# Patient Record
(demographics unavailable — no encounter records)

---

## 2017-03-17 NOTE — HPEPDOC
Physiatrist Note





ADMISSION H&P





DATE OF ADMISSION: 3/17/2017


DATE OF SERVICE: 3/17/2017





IDENTIFICATION STATEMENT: Patient is a 46-year-old woman with left dorsal basal 

ganglia CVA, right hemiparesis admitted for comprehensive integrated inpatient 

rehabilitation.





HISTORY OF PRESENT ILLNESS: Patient is a 46-year-old right hand dominant woman 

with multiple medical comorbidities who was admitted to [facility] on  with acute onset right hemiparesis. Patient describes episode of 

dizziness and right upper/lower extremity numbness while at work which 

resolved. Symptoms recurred shortly thereafter and were now accompanied by 

slurred speech. EMS was activated and patient was brought to [facility]. In transit to the hospital symptoms evolved to include right 

hemiparesis. She was found to have a left dorsal basal ganglia acute infarct. 

She was started on dual antiplatelet platelet therapy (aspirin and Plavix). 

Echocardiogram was negative for any thrombus. CT scan of the head and neck was 

without significant atherosclerotic disease. During her hospital stay, her 

neurologic symptoms waxed and waned for which she underwent a repeat CT which 

was negative for any hemorrhage. Due to decline in the patients baseline 

functional status, recommendation was for acute rehabilitation. On 2017 

the patient was deemed stable for discharge to [facility] 

inpatient rehabilitation unit.





PAST MEDICAL HISTORY:


Hypertension


Hyperlipidemia


Diabetes mellitus type 2


Tobacco use


Depression & Anxiety


Thyroid nodule, benign


Low back pain s/p AVIS w/o effect





PAST SURGICAL HISTORY:


Hysterectomy


 x 2


Tubal ligation





ALLERGIES: No known drug allergies





MEDICATIONS:


Aspirin 325 mg daily


Lipitor 80 mg by mouth every afternoon


Wellbutrin  mg every 24 hours


Buspar 10 mg by mouth every 8 hours


Plavix 75 mg by mouth daily


Glipizide 5 mg every 24 hours


Losartan 25 mg by mouth daily





FAMILY HISTORY: Father suffered from Parkinsons disease  in his 50s. 

Mother suffered from cardiac issues  in her 70s. Sister suffers from 

diabetes. All 3 daughters have depression, one daughter has bipolar disease and 

recovering from drug addiction. Another daughter has thyroid problems.





SOCIAL HISTORY: Prior to admission patient was working in the evenings for a 

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 She lives alone on a third floor 

apartment with elevator access. There are no steps to manage once within the 

apartment. He reports smoking daily for the last 12 years, started at 2 packs 

per day and just prior to her stroke had decreased to about  ppd. Denies 

alcohol use. No illicit drug use, past or present.





Review of Systems:  General:  no chills, +fatigue, no weight changes. Eyes: no 

recent change of vision, + reading glasses. Ears, Nose & Throat: no sore throat

, decreased hearing or nasal discharge. Chronic swallowing problems 2nd thyroid 

nodule. +tinnitus. Cardiovascular:  no chest pain, claudication, edema, 

syncopal episodes. Pul: no cough, SOB, orthopnea. GI: + constipation, no 

abdominal pain, GERD, N/V, incontinence. Genitourinary: no dysuria. 

Gynecological: no vaginal bleeding, +hysterectomy. Musculoskeletal:  +chronic 

episodic mild low back pain; no neck pain, mild episodic knee pain, no muscle 

pain. Neurological: +numbness & paresthesias right side body, including face, 

no tremors, HA. Hematological: No bleeding disorders. Skin: no rashes. 

Psychiatric: +depression & anxiety for which she was usually on Celexa and 

Buspar, no other behavioral issues.





VITAL SIGNS: 131/92; 73; 98% ra; 18





PHYSICAL EXAMINATION:


GENERAL: Well nourished, well developed, sitting up in bed, right lean, no 

acute distress.


HEENT: Normocephalic, atraumatic. Slight right facial droop. No jugular venous 

distention (JVD). PERRL, EOMI


CARDIOVASCULAR: S1, S2, regular rate. No LL edema or calf tenderness b/l


LUNGS: CTA b/l, no W/R/R


ABDOMEN: Soft, nontender, nondistended. +normoactive bowel sounds throughout.


NEUROLOGICAL: Alert and oriented x 3. Answers all questions appropriately, 

memory seems intact. Speech fluent. No significant dysarthria. No aphasia. F-t-

N intact left side. Manual muscle testin/5 left upper and lower limbs all 

major muscle groups; 0/5 right UL all major muscle groups; 2/5 right HF, 1/5 

right adductors; 3-/5 right KE, 3/5 right KF, 0/5 DF/SD, 2/5 right EHL. 

Sensation: decreased diffusely right side body, Intact ST left side. Deep 

tendon reflexes: 3+ right biceps, unable elicit right patellar, 2+ left biceps, 

1+ left patellar. 


SKIN: No skin breakdown.





LABORATORY DATA: 


3/15/17 (from [facility]): WBC 13.7; neutr 46%; HGB 13.9; Hct 39.6; BUN 10, Cr 0.5; 

Na 139; K 4.2; Glu 103





IMAGING:


CT A of the head: The intracranial segments of both the internal carotid, 

anterior, middle and posterior cerebral arteries, and anterior communicating 

artery are patent. The intracranial segments of the vertebral arteries and 

basilar arteries are patent. No aneurysm, hemodynamically significant flow 

stenosis or arteriovenous malformation is identified.





CTA of the neck: There is common origin of the brachiocephalic and left common 

carotid artery. The RN origins of the brachiocephalic, lateral common carotid 

and subclavian arteries are patent. Bilateral cervical internal carotid 

arteries are patent without evidence of hemodynamically significant stenosis. 

Origins of the bilateral vertebral arteries are patent. The right vertebral 

artery is dominant. Bilateral cervical vertebral arteries are patent without 

evidence of significant luminal stenosis or dissection. There is a 1.9 x 1.4 x 

2.7 cm heterogeneous enhancing nodule within the left thyroid lobe, which is 

enlarged.





MR brain 2017: Findings consistent with infarct involving left dorsal 

basal ganglia extending to the corona radiata.





2-D echocardiogram: Negative saline bubble study. Left ventricle size is 

normal. Estimated left ventricular ejection fraction 55-60%. Right ventricular 

systolic systolic function is normal. Aortic valve leaflets are mildly 

thickened. There is trace aortic regurgitation. Both mitral leaflets are mildly 

thickened.





Overnight oximetry: No evidence of desaturation.





CT of the head on 2017: Left basal ganglia stroke.





FUNCTIONAL STATUS, Premorbid: Independent with ADLs and ambulation. Working 

full time at night. Daughter can help her  upon discharge.


   


ASSESSMENT AND PLAN:


1. Left dorsal basal ganglia acute infarct with right hemiparesis and 

dysarthria tilting in decreased mobility and dysfunctional ADLs: Maintain 

patient on aspirin and Plavix for 30 days then switch to Asa alone. Continue 

Lipitor 80 mg daily. Maintain systolic blood pressure less than 140 mmHg. 

Patient will undergo thorough physical, occupational and speech therapy 

evaluations followed by daily therapy. Rehabilitation nursing for bladder, 

bowel and medication management.


2. Hypertension: Maintain Cozaar 25 mg daily with adjustments as indicated.


3. Anxiety/depression: Maintain BuSpar. Was usually on Celexa, but changed to 

Wellbutrin at [facility] to assist in smoking cessation.


4. Diabetes mellitus type 2 continue glipizide 5 mg daily.


5. Bowel: Constipation. Maintain patient on Colace senna scheduled along with 

milk of magnesia and MiraLAX on an as-needed basis.


6. Diet/nutrition: Will obtain a prealbumin with morning labs. Carbohydrate 

consistent diet.





POST ADMISSION PHYSICIAN EVALUATION: On evaluation of the patient today there'

ve been no significant medical issues or functional changes as compared to 

those noted in the preadmission screening document. This patient's inpatient 

rehabilitation remains necessary in light of the above conditions. The patient'

s medical condition requires specialized care with physicians specially trained 

in physical medicine rehabilitation. The patient is capable motivated to 

participate in a minimum of 3 hours of therapy daily, 5 days minimum per week, 

and requires intensive inpatient rehabilitation to improve their functional 

status so that they can be safely to discharge back to their home.





PROGNOSIS: Good





ESTIMATED LENGTH OF STAY: 14 days.


/


Vital Signs





 Vital Sign - Last 24 Hours








 3/17/17





 13:20


 


Temp 97.5


 


Pulse 73


 


Resp 18


 


B/P 134/92


 


Pulse Ox 98


 


O2 Delivery Room Air











Home Medications


Scheduled


Aspirin (Aspirin) 325 Mg Tab 325 MG PO DAILY (Reported) 


Atorvastatin Calcium (Atorvastatin Calcium) 80 Mg Tab 80 MG PO QHS (Reported) 


Bupropion Hcl (Bupropion HCl Xl) 150 Mg Tab 150 MG PO DAILY (Reported) 


Buspirone HCl (Buspirone HCl) 10 Mg Tab 10 MG PO TID (Reported) 


Clopidogrel Bisulfate (Plavix) 75 Mg Tab 75 MG PO DAILY (Reported) 


Glipizide (Glipizide ER) 5 Mg Tab 5 MG PO DAILY (Reported) 


Losartan Potassium (Losartan Potassium) 25 Mg Tab 25 MG PO DAILY (Reported) 


Nicotine (Nicotine 7MG Patch) 1 Patch Tdsy 1 PATCH TD DAILY (Reported) 


   APPLIED TO LEFT ARM 





Scheduled PRN


Acetaminophen (Acetaminophen) 325 Mg Tab 650 MG PO Q4H PRN PRN PAIN / FEVER (

Reported) 





Allergies


Coded Allergies:  


     Metformin (Unverified  Adverse Reaction, Unknown, "STATES CAN NOT TAKE" 

DOES NOT WORK WELL, 3/17/17)








DONNA CAMPBELL MD Mar 17, 2017 14:46

## 2017-03-20 NOTE — IPNPDOC
Physiatrist Progress Note





PROGRESS NOTE





DATE OF ADMISSION: 3/17/2017


DATE OF SERVICE: 3/20/2017





IDENTIFICATION STATEMENT: Patient is a 46-year-old woman with left dorsal basal 

ganglia CVA, right hemiparesis admitted for comprehensive integrated inpatient 

rehabilitation.





PAST MEDICAL HISTORY:


Hypertension


Hyperlipidemia


Diabetes mellitus type 2


Tobacco use


Depression & Anxiety


Thyroid nodule, benign


Low back pain s/p AVIS w/o effect





PAST SURGICAL HISTORY:


Hysterectomy


 x 2


Tubal ligation





ALLERGIES: No known drug allergies





MEDICATIONS:


Aspirin 325 mg daily


Lipitor 80 mg by mouth every afternoon


Wellbutrin  mg every 24 hours


Buspar 10 mg by mouth every 8 hours prn


Plavix 75 mg by mouth daily


Glipizide 5 mg every 24 hours


Losartan 25 mg by mouth daily





SUBJECTIVE: Patient reports urinary frequency, otherwise not complaints. Slept 

ok. No events over weekend. Denies any CP, SOB, N/V, HA or new weakness.





VITAL SIGNS:  97.7 degrees F; 124/73; 68; 97% ra; 18





PHYSICAL EXAMINATION:


GENERAL: Well nourished, well developed, standing up with walker, no acute 

distress.


HEENT: Normocephalic, atraumatic. Slight right facial droop. PERRL, EOMI


CARDIOVASCULAR: S1, S2, regular rate. No LL edema or calf tenderness b/l


LUNGS: CTA b/l, no W/R/R


ABDOMEN: Soft, nontender, nondistended. +normoactive bowel sounds throughout.


NEUROLOGICAL: Alert and oriented x 3. Answers all questions appropriately, 

memory intact. Speech fluent. Manual muscle testin/5 left upper and lower 

limbs all major muscle groups; 0/5 right UL all major muscle groups except 1/5 

shoulder shrug; 2/5 right HF, 1/5 right adductors; 3-/5 right KE, 3/5 right KF, 

0/5 DF/SC, 2/5 right EHL. Sensation: decreased diffusely right side body, 

Intact ST left side. Deep tendon reflexes: 3+ right biceps, unable elicit right 

patellar, 2+ left biceps, 1+ left patellar. 


SKIN: No skin breakdown.





LABORATORY DATA: 


3/20/17: reviewed see below





3/15/17 (from [facility]): WBC 13.7; neutr 46%; HGB 13.9; Hct 39.6; BUN 10, Cr 0.5; 

Na 139; K 4.2; Glu 103





IMAGING:


CT A of the head: The intracranial segments of both the internal carotid, 

anterior, middle and posterior cerebral arteries, and anterior communicating 

artery are patent. The intracranial segments of the vertebral arteries and 

basilar arteries are patent. No aneurysm, hemodynamically significant flow 

stenosis or arteriovenous malformation is identified.





CTA of the neck: There is common origin of the brachiocephalic and left common 

carotid artery. The RN origins of the brachiocephalic, lateral common carotid 

and subclavian arteries are patent. Bilateral cervical internal carotid 

arteries are patent without evidence of hemodynamically significant stenosis. 

Origins of the bilateral vertebral arteries are patent. The right vertebral 

artery is dominant. Bilateral cervical vertebral arteries are patent without 

evidence of significant luminal stenosis or dissection. There is a 1.9 x 1.4 x 

2.7 cm heterogeneous enhancing nodule within the left thyroid lobe, which is 

enlarged.





MR brain 2017: Findings consistent with infarct involving left dorsal 

basal ganglia extending to the corona radiata.





2-D echocardiogram: Negative saline bubble study. Left ventricle size is 

normal. Estimated left ventricular ejection fraction 55-60%. Right ventricular 

systolic systolic function is normal. Aortic valve leaflets are mildly 

thickened. There is trace aortic regurgitation. Both mitral leaflets are mildly 

thickened.





Overnight oximetry: No evidence of desaturation.





CT of the head on 2017: Left basal ganglia stroke.





FUNCTIONAL STATUS, Premorbid: Independent with ADLs and ambulation. Working 

full time at night. Daughter can help her /7 upon discharge.


   


ASSESSMENT AND PLAN:


1. Left dorsal basal ganglia acute infarct with right hemiparesis and 

dysarthria resulting in decreased mobility and dysfunctional ADLs: Maintain 

patient on aspirin and Plavix for 30 days then switch to Asa alone. Continue 

Lipitor 80 mg daily. Maintain systolic blood pressure less than 140 mmHg. 

Continue daily therapies. Rehabilitation nursing for bladder, bowel and 

medication management.





2. UTI, e.coli: started on Omnicef based on susceptibility panel.





3. Hypertension: Maintain Cozaar 25 mg daily with adjustments as indicated.





4. Anxiety/depression: Maintain BuSpar and Wellbutrin [Hx: Was on Celexa, but 

changed to Wellbutrin at [facility] to assist in smoking cessation].





5. Diabetes mellitus type 2 continue glipizide 5 mg daily.





6. Bowel: Constipation resolved. Maintain patient on Colace senna scheduled 

along with milk of magnesia and MiraLAX on an as-needed basis.





7. Diet/nutrition: Prealbumin wnl. Mechanical soft, nectar thick. Carbohydrate 

consistent diet.


/


Vital Signs





 Vital Sign - Last 24 Hours








 3/19/17 3/19/17 3/20/17 3/20/17





 14:00 20:00 06:00 08:32


 


Temp 98.1 98.3 97.7 


 


Pulse 73 87 68 


 


Resp 18 18 18 


 


B/P 133/74 132/72 124/73 124/73


 


Pulse Ox 97 97 97 


 


O2 Delivery Room Air Room Air Room Air 











Laboratory Data


CBC/BMP


 Laboratory Tests


3/20/17 06:20








Calcium Level 9.0, Red Blood Count 5.15, Mean Corpuscular Volume 84.6, Mean 

Corpuscular Hemoglobin 28.7, Mean Corpuscular Hemoglobin Concent 33.9, Red Cell 

Distribution Width 12.4, Neutrophils (%) (Auto) 52.1, Lymphocytes (%) (Auto) 

30.8, Monocytes (%) (Auto) 5.6 H, Eosinophils (%) (Auto) 8.6 H, Basophils (%) (

Auto) 0.5, Neutrophils # (Auto) 8.4 H, Lymphocytes # (Auto) 5.0 H, Monocytes # (

Auto) 0.9 H, Eosinophils # (Auto) 1.4 H, Basophils # (Auto) 0.1


Labs 24H


 Laboratory Tests 2


3/20/17 06:20: 


Anion Gap 8, White Blood Count 16.1H, Red Blood Count 5.15, Hemoglobin 14.8, 

Hematocrit 43.6, Mean Corpuscular Volume 84.6, Mean Corpuscular Hemoglobin 28.7

, Mean Corpuscular Hemoglobin Concent 33.9, Red Cell Distribution Width 12.4, 

Platelet Count 312, Neutrophils (%) (Auto) 52.1, Lymphocytes (%) (Auto) 30.8, 

Monocytes (%) (Auto) 5.6H, Eosinophils (%) (Auto) 8.6H, Basophils (%) (Auto) 0.5

, Neutrophils # (Auto) 8.4H, Lymphocytes # (Auto) 5.0H, Monocytes # (Auto) 0.9H

, Eosinophils # (Auto) 1.4H, Basophils # (Auto) 0.1, Blood Urea Nitrogen 17, 

Creatinine 0.73, Sodium Level 141, Potassium Level 4.5, Chloride Level 104, 

Carbon Dioxide Level 29, Calcium Level 9.0, Glomerular Filtration Rate > 60.0, 

Large Unclassified Cells # 0.4, Large Unclassified Cells % 2.4





Microbiology





 Microbiology


3/19/17 Blood Culture, Received


          Pending


3/19/17 Blood Culture, Received


          Pending


3/18/17 Urine Culture - Final, Complete


          Escherichia Coli





Allergies


Allergies:  


Coded Allergies:  


     Metformin (Unverified  Adverse Reaction, Unknown, "STATES CAN NOT TAKE" 

DOES NOT WORK WELL, 3/17/17)





Current Medications


Current Medications





 Current Medications


Acetaminophen (Tylenol Tab) 650 mg Q4HP  PRN PO MILD PAIN (PS 1-4) Last 

administered on 3/20/17at 04:09;  Start 3/17/17 at 13:45;  Stop 17 at 13:44


Aspirin (Aspirin) 325 mg DAILY PO  Last administered on 3/20/17at 08:30;  Start 

3/18/17 at 09:00;  Stop 17 at 08:59


Atorvastatin Calcium (Lipitor) 80 mg QHS PO  Last administered on 3/19/17at 21:

27;  Start 3/17/17 at 21:00;  Stop 17 at 20:59


Bupropion HCl (Wellbutrin Xl) 150 mg DAILY PO  Last administered on 3/20/17at 08

:31;  Start 3/18/17 at 09:00;  Stop 17 at 08:59


Buspirone HCl (Buspar) 10 mg Q8HP  PRN PO ANXIETY Last administered on 3/19/

17at 23:23;  Start 3/17/17 at 14:00;  Stop 17 at 13:59


Cefdinir (Omnicef) 300 mg BID PO ;  Start 3/20/17 at 09:00;  Stop 3/27/17 at 08:

59


Clopidogrel Bisulfate (PLAVix) 75 mg DAILY PO  Last administered on 3/20/17at 08

:32;  Start 3/18/17 at 09:00;  Stop 17 at 08:59


Docusate Sodium (Colace) 100 mg BID PO  Last administered on 3/19/17at 07:53;  

Start 3/17/17 at 21:00;  Stop 17 at 20:59


Glipizide (Glucotrol Xl) 5 mg DAILY@0730 PO  Last administered on 3/20/17at 08:

32;  Start 3/17/17 at 07:30;  Stop 17 at 07:29


Home Med (Med Rec Complete!)  ASDIRECTED XX ;  Start 3/17/17 at 14:15;  Stop 3/

17/17 at 14:15;  Status DC


Losartan Potassium (Cozaar) 25 mg DAILY PO  Last administered on 3/20/17at 08:32

;  Start 3/18/17 at 09:00;  Stop 17 at 08:59


Magnesium Hydroxide (Milk Of Magnesia) 30 ml DAILYPRN  PRN PO CONSTIPATION Last 

administered on 3/17/17at 23:37;  Start 3/17/17 at 13:45;  Stop 17 at 13:44


Pantoprazole Sodium (Protonix) 40 mg DAILY PO  Last administered on 3/20/17at 08

:31;  Start 3/17/17 at 09:00;  Stop 17 at 08:59


Polyethylene Glycol (Miralax) 1 pkt DAILY  PRN PO CONSTIPATION Last 

administered on 3/17/17at 23:38;  Start 3/17/17 at 13:45;  Stop 17 at 13:44


Senna (Senokot) 1 tab QHS PO  Last administered on 3/18/17at 20:13;  Start 3/17/

17 at 21:00;  Stop 17 at 20:59








DONNA CAMPBELL MD Mar 20, 2017 10:44

## 2017-03-21 NOTE — IPNPDOC
Physiatrist Progress Note





PROGRESS NOTE





DATE OF ADMISSION: 3/17/2017





IDENTIFICATION STATEMENT: Patient is a 46-year-old woman with left dorsal basal 

ganglia CVA, right hemiparesis admitted for comprehensive integrated inpatient 

rehabilitation.





PAST MEDICAL HISTORY:


Hypertension


Hyperlipidemia


Diabetes mellitus type 2


Tobacco use


Depression & Anxiety


Thyroid nodule, benign


Low back pain s/p AVIS w/o effect





PAST SURGICAL HISTORY:


Hysterectomy


 x 2


Tubal ligation





ALLERGIES: No known drug allergies





MEDICATIONS:


Aspirin 325 mg daily


Lipitor 80 mg by mouth every afternoon


Wellbutrin  mg every 24 hours


Buspar 10 mg by mouth every 8 hours prn


Plavix 75 mg by mouth daily


Glipizide 5 mg every 24 hours


Losartan 25 mg by mouth daily





SUBJECTIVE:  Patient w/o complaints. Feels good today. No issues overnight. 

Slept ok. Feels like betting better. Tired from therapy. Denies any CP, SOB, N/V

, C/D, HA





VITAL SIGNS:  97.5 degrees F; 124/74; 71; 98% ra; 18





PHYSICAL EXAMINATION:


GENERAL: Well nourished, well developed, sitting in chair, no acute distress.


HEENT: Normocephalic, atraumatic. Slight right facial droop. PERRL, EOMI


CARDIOVASCULAR: S1, S2, regular rate. No LL edema or calf tenderness b/l


LUNGS: CTA b/l, no W/R/R


ABDOMEN: Soft, nontender, nondistended. +normoactive bowel sounds throughout.


NEUROLOGICAL: Alert and oriented x 3. Answers all questions appropriately, 

memory intact. Speech fluent. MMT: 5/5 left upper and lower limbs all major 

muscle groups; 0/5 right UL all major muscle groups except 1/5 shoulder shrug; 2

/5 elbow flexion, 1/5 elbow extension,3-/5 , 2/5 right HF, 1/5 right 

adductors; 3-/5 right KE, 3/5 right KF, 0/5 DF/KY, 2/5 right EHL. 


SKIN: No skin breakdown.





LABORATORY DATA: 


3/21/17: reviewed, see below





3/15/17 (from [facility]): WBC 13.7; neutr 46%; HGB 13.9; Hct 39.6; BUN 10, Cr 0.5; 

Na 139; K 4.2; Glu 103





IMAGING:


CT A of the head: The intracranial segments of both the internal carotid, 

anterior, middle and posterior cerebral arteries, and anterior communicating 

artery are patent. The intracranial segments of the vertebral arteries and 

basilar arteries are patent. No aneurysm, hemodynamically significant flow 

stenosis or arteriovenous malformation is identified.





CTA of the neck: There is common origin of the brachiocephalic and left common 

carotid artery. The RN origins of the brachiocephalic, lateral common carotid 

and subclavian arteries are patent. Bilateral cervical internal carotid 

arteries are patent without evidence of hemodynamically significant stenosis. 

Origins of the bilateral vertebral arteries are patent. The right vertebral 

artery is dominant. Bilateral cervical vertebral arteries are patent without 

evidence of significant luminal stenosis or dissection. There is a 1.9 x 1.4 x 

2.7 cm heterogeneous enhancing nodule within the left thyroid lobe, which is 

enlarged.





MR brain 2017: Findings consistent with infarct involving left dorsal 

basal ganglia extending to the corona radiata.





2-D echocardiogram: Negative saline bubble study. Left ventricle size is 

normal. Estimated left ventricular ejection fraction 55-60%. Right ventricular 

systolic systolic function is normal. Aortic valve leaflets are mildly 

thickened. There is trace aortic regurgitation. Both mitral leaflets are mildly 

thickened.





Overnight oximetry: No evidence of desaturation.





CT of the head on 2017: Left basal ganglia stroke.





FUNCTIONAL STATUS, Premorbid: Independent with ADLs and ambulation. Working 

full time at night prior to CVA. Daughter can help her  upon discharge.


   


ASSESSMENT AND PLAN:


1. Left dorsal basal ganglia acute infarct with right hemiparesis and 

dysarthria resulting in decreased mobility and dysfunctional ADLs: Maintain 

patient on aspirin and Plavix for 30 days then switch to Asa alone. Continue 

Lipitor 80 mg daily. Maintain systolic blood pressure less than 140 mmHg. 

Continue daily therapies. Rehabilitation nursing for bladder, bowel and 

medication management.





2. UTI, e.coli: Seems to be responding to abx. Continue Omnicef (d#2/).





3. Hypertension: Adequately controlled.  Maintain Cozaar 25 mg daily with 

adjustments as indicated.





4. Anxiety/depression: Maintain BuSpar and Wellbutrin [Hx: Was on Celexa, but 

changed to Wellbutrin at [facility] to assist in smoking cessation].





5. Diabetes mellitus type 2: Continue glipizide 5 mg daily.





6. Bowel: Constipation resolved. Maintain patient on Colace senna scheduled 

along with milk of magnesia and MiraLAX on an as-needed basis.





7. Diet/nutrition: Prealbumin wnl. Mechanical soft, nectar thick. Carbohydrate 

consistent diet.


/


Vital Signs





 Vital Sign - Last 24 Hours








 3/20/17 3/20/17 3/21/17 3/21/17





 14:00 20:00 06:00 08:44


 


Temp 97.3 98.3 97.5 


 


Pulse 76 88 71 


 


Resp 20 19 18 


 


B/P 137/81 146/76 126/75 124/74


 


Pulse Ox 99 97 98 


 


O2 Delivery  Room Air Room Air 











Laboratory Data


CBC/BMP


 Laboratory Tests


3/21/17 06:43








Calcium Level 8.7, Red Blood Count 4.91, Mean Corpuscular Volume 85.8, Mean 

Corpuscular Hemoglobin 29.1, Mean Corpuscular Hemoglobin Concent 33.9, Red Cell 

Distribution Width 12.4, Neutrophils (%) (Auto) 46.0, Lymphocytes (%) (Auto) 

36.1, Monocytes (%) (Auto) 5.6 H, Eosinophils (%) (Auto) 9.5 H, Basophils (%) (

Auto) 0.5, Neutrophils # (Auto) 5.9, Lymphocytes # (Auto) 4.9 H, Monocytes # (

Auto) 0.7, Eosinophils # (Auto) 1.2 H, Basophils # (Auto) 0.1


Labs 24H


 Laboratory Tests 2


3/21/17 06:43: 


Anion Gap 6L, White Blood Count 12.8H, Red Blood Count 4.91, Hemoglobin 14.3, 

Hematocrit 42.1, Mean Corpuscular Volume 85.8, Mean Corpuscular Hemoglobin 29.1

, Mean Corpuscular Hemoglobin Concent 33.9, Red Cell Distribution Width 12.4, 

Platelet Count 303, Neutrophils (%) (Auto) 46.0, Lymphocytes (%) (Auto) 36.1, 

Monocytes (%) (Auto) 5.6H, Eosinophils (%) (Auto) 9.5H, Basophils (%) (Auto) 0.5

, Neutrophils # (Auto) 5.9, Lymphocytes # (Auto) 4.9H, Monocytes # (Auto) 0.7, 

Eosinophils # (Auto) 1.2H, Basophils # (Auto) 0.1, Blood Urea Nitrogen 16, 

Creatinine 0.68, Sodium Level 141, Potassium Level 4.0, Chloride Level 107, 

Carbon Dioxide Level 28, Calcium Level 8.7, Glomerular Filtration Rate > 60.0, 

Large Unclassified Cells # 0.3, Large Unclassified Cells % 2.2





Microbiology





 Microbiology


3/19/17 Blood Culture - Preliminary, Resulted


          No growth after 24 hours . All specim...


3/19/17 Blood Culture - Preliminary, Resulted


          No growth after 24 hours . All specim...


3/18/17 Urine Culture - Final, Complete


          Escherichia Coli





Allergies


Allergies:  


Coded Allergies:  


     Metformin (Unverified  Adverse Reaction, Unknown, "STATES CAN NOT TAKE" 

DOES NOT WORK WELL, 3/17/17)





Current Medications


Current Medications





 Current Medications


Acetaminophen (Tylenol Tab) 650 mg Q4HP  PRN PO MILD PAIN (PS 1-4) Last 

administered on 3/20/17at 04:09;  Start 3/17/17 at 13:45;  Stop 17 at 13:44


Aspirin (Aspirin) 325 mg DAILY PO  Last administered on 3/21/17at 08:45;  Start 

3/18/17 at 09:00;  Stop 17 at 08:59


Atorvastatin Calcium (Lipitor) 80 mg QHS PO  Last administered on 3/20/17at 21:

28;  Start 3/17/17 at 21:00;  Stop 17 at 20:59


Bupropion HCl (Wellbutrin Xl) 150 mg DAILY PO  Last administered on 3/21/17at 08

:45;  Start 3/18/17 at 09:00;  Stop 17 at 08:59


Buspirone HCl (Buspar) 10 mg Q8HP  PRN PO ANXIETY Last administered on 3/20/

17at 21:27;  Start 3/17/17 at 14:00;  Stop 17 at 13:59


Cefdinir (Omnicef) 300 mg BID PO  Last administered on 3/21/17at 08:45;  Start 3

/20/17 at 09:00;  Stop 3/27/17 at 08:59


Clopidogrel Bisulfate (PLAVix) 75 mg DAILY PO  Last administered on 3/21/17at 08

:41;  Start 3/18/17 at 09:00;  Stop 17 at 08:59


Docusate Sodium (Colace) 100 mg BID PO  Last administered on 3/21/17at 08:46;  

Start 3/17/17 at 21:00;  Stop 17 at 20:59


Glipizide (Glucotrol Xl) 5 mg DAILY@0730 PO  Last administered on 3/21/17at 08:

44;  Start 3/17/17 at 07:30;  Stop 17 at 07:29


Home Med (Med Rec Complete!)  ASDIRECTED XX ;  Start 3/17/17 at 14:15;  Stop 3/

17/17 at 14:15;  Status DC


Losartan Potassium (Cozaar) 25 mg DAILY PO  Last administered on 3/21/17at 08:44

;  Start 3/18/17 at 09:00;  Stop 17 at 08:59


Magnesium Hydroxide (Milk Of Magnesia) 30 ml DAILYPRN  PRN PO CONSTIPATION Last 

administered on 3/17/17at 23:37;  Start 3/17/17 at 13:45;  Stop 17 at 13:44


Pantoprazole Sodium (Protonix) 40 mg DAILY PO  Last administered on 3/21/17at 08

:45;  Start 3/17/17 at 09:00;  Stop 17 at 08:59


Polyethylene Glycol (Miralax) 1 pkt DAILY  PRN PO CONSTIPATION Last 

administered on 3/17/17at 23:38;  Start 3/17/17 at 13:45;  Stop 17 at 13:44


Senna (Senokot) 1 tab QHS PO  Last administered on 3/18/17at 20:13;  Start 3/17/

17 at 21:00;  Stop 17 at 20:59








DONNA CAMPBELL MD Mar 21, 2017 11:41

## 2017-03-22 NOTE — IPNPDOC
Physiatrist Progress Note





PROGRESS NOTE





DATE OF ADMISSION: 3/17/2017





IDENTIFICATION STATEMENT: Patient is a 46-year-old woman with left dorsal basal 

ganglia CVA, right hemiparesis admitted for comprehensive integrated inpatient 

rehabilitation.





PAST MEDICAL HISTORY:


Hypertension


Hyperlipidemia


Diabetes mellitus type 2


Tobacco use


Depression & Anxiety


Thyroid nodule, benign


Low back pain s/p AVIS w/o effect





PAST SURGICAL HISTORY:


Hysterectomy


 x 2


Tubal ligation





ALLERGIES: No known drug allergies





MEDICATIONS:


Aspirin 325 mg daily


Lipitor 80 mg by mouth every afternoon


Wellbutrin  mg every 24 hours


Busbar 10 mg by mouth every 8 hours prn


Plavix 75 mg by mouth daily


Glipizide 5 mg every 24 hours


Losartan 25 mg by mouth daily





SUBJECTIVE:  Patient states she frustrated by things she still cant do, states 

last night had itch on her left arm and couldnt scratch it. No issues 

overnight. Slept ok. Denies any CP, SOB, N/V, C/D, HA





VITAL SIGNS:  97.3 degrees F; 148/80; 68; 98% ra; 19





PHYSICAL EXAMINATION:


GENERAL: Well nourished, well developed, sitting in chair, no acute distress.


HEENT: Normocephalic, atraumatic. Right facial droop (decreased). PERRL, EOMI


CARDIOVASCULAR: S1, S2, regular rate. No LL edema or calf tenderness b/l


LUNGS: CTA b/l, no W/R/R


ABDOMEN: Soft, nontender, nondistended. +normoactive bowel sounds throughout.


NEUROLOGICAL: Alert and oriented x 3. Answers all questions appropriately, 

memory intact. Speech fluent. MMT: 5/5 left upper and lower limbs all major 

muscle groups; 0/5 right UL all major muscle groups except 1/5 shoulder shrug; 2

/5 elbow flexion, 1/5 elbow extension,3-/5 , 3/5 right HF, 3-/5 right KE, 3/

5 right KF, 0/5 DF/CO, 2/5 right EHL. 


SKIN: No skin breakdown.





LABORATORY DATA: 


3/22/17: reviewed, see below





3/15/17 (from [facility]): WBC 13.7; neutr 46%; HGB 13.9; Hct 39.6; BUN 10, Cr 0.5; 

Na 139; K 4.2; Glu 103





IMAGING:


CT A of the head: The intracranial segments of both the internal carotid, 

anterior, middle and posterior cerebral arteries, and anterior communicating 

artery are patent. The intracranial segments of the vertebral arteries and 

basilar arteries are patent. No aneurysm, hemodynamically significant flow 

stenosis or arteriovenous malformation is identified.





CTA of the neck: There is common origin of the brachiocephalic and left common 

carotid artery. The RN origins of the brachiocephalic, lateral common carotid 

and subclavian arteries are patent. Bilateral cervical internal carotid 

arteries are patent without evidence of hemodynamically significant stenosis. 

Origins of the bilateral vertebral arteries are patent. The right vertebral 

artery is dominant. Bilateral cervical vertebral arteries are patent without 

evidence of significant luminal stenosis or dissection. There is a 1.9 x 1.4 x 

2.7 cm heterogeneous enhancing nodule within the left thyroid lobe, which is 

enlarged.





MR brain 2017: Findings consistent with infarct involving left dorsal 

basal ganglia extending to the corona radiata.





2-D echocardiogram: Negative saline bubble study. Left ventricle size is 

normal. Estimated left ventricular ejection fraction 55-60%. Right ventricular 

systolic systolic function is normal. Aortic valve leaflets are mildly 

thickened. There is trace aortic regurgitation. Both mitral leaflets are mildly 

thickened.





Overnight oximetry: No evidence of desaturation.





CT of the head on 2017: Left basal ganglia stroke.





FUNCTIONAL STATUS, Premorbid: Independent with ADLs and ambulation. Working 

full time at night prior to CVA. Daughter can help her  upon discharge.


   


ASSESSMENT AND PLAN:


1. Left dorsal basal ganglia acute infarct with right hemiparesis and 

dysarthria resulting in decreased mobility and dysfunctional ADLs: Maintain 

patient on aspirin and Plavix for 30 days post d/c from [facility] then switch to Asa 

alone. Continue Lipitor 80 mg daily. Maintain systolic blood pressure less than 

140 mmHg. Continue daily therapies. Rehabilitation nursing for bladder, bowel 

and medication management.





2. UTI, e.coli: Continue Omnicef (d#3/7).





3. Hypertension: Maintain Cozaar 25 mg daily with adjustments as indicated.





4. Anxiety/depression: Given persistent significant right UL weakness, trial 

Prozac to assist motor recovery. Discussed with patient that would need to d/c 

Wellbutrin and she states he prefers to trial Prozac. She is not concerned with 

smoking cessation benefit of Wellbutrin. Risks, benefits and alternatives of 

Prozac discussed. Potential withdrawal symptoms of Wellbutrin also discussed. 

Patient wishes to proceed with change. Maintain BuSpar current dose [Hx: Was on 

Celexa prior to stroke, but changed to Wellbutrin at [facility] to assist in smoking 

cessation].





5. Diabetes mellitus type 2: Continue glipizide 5 mg daily.





6. Bowel: Constipation resolved. Maintain patient on Colace senna scheduled 

along with milk of magnesia and MiraLAX on an as-needed basis.





7. Diet/nutrition: Prealbumin wnl. Mechanical soft, nectar thick. Carbohydrate 

consistent diet.


/


Vital Signs





 Vital Sign - Last 24 Hours








 3/21/17 3/21/17 3/22/17 3/22/17





 14:00 20:00 06:00 08:18


 


Temp 98.6 97.1 97.3 


 


Pulse 86 90 68 


 


Resp 18 18 19 


 


B/P 117/74 148/61 141/80 148/80


 


Pulse Ox 99 97 98 


 


O2 Delivery  Room Air Room Air 











Laboratory Data


CBC/BMP


 Laboratory Tests


3/22/17 08:22








Calcium Level 8.4 L, Red Blood Count 5.08, Mean Corpuscular Volume 85.3, Mean 

Corpuscular Hemoglobin 29.0, Mean Corpuscular Hemoglobin Concent 34.0, Red Cell 

Distribution Width 12.3, Neutrophils (%) (Auto) 53.9, Lymphocytes (%) (Auto) 

28.2, Monocytes (%) (Auto) 5.6 H, Eosinophils (%) (Auto) 9.7 H, Basophils (%) (

Auto) 0.5, Neutrophils # (Auto) 7.8 H, Lymphocytes # (Auto) 4.4, Monocytes # (

Auto) 0.8, Eosinophils # (Auto) 1.4 H, Basophils # (Auto) 0.1


Labs 24H


 Laboratory Tests 2


3/22/17 08:22: 


Anion Gap 9, White Blood Count 14.5H, Red Blood Count 5.08, Hemoglobin 14.8, 

Hematocrit 43.3, Mean Corpuscular Volume 85.3, Mean Corpuscular Hemoglobin 29.0

, Mean Corpuscular Hemoglobin Concent 34.0, Red Cell Distribution Width 12.3, 

Platelet Count 330, Neutrophils (%) (Auto) 53.9, Lymphocytes (%) (Auto) 28.2, 

Monocytes (%) (Auto) 5.6H, Eosinophils (%) (Auto) 9.7H, Basophils (%) (Auto) 0.5

, Neutrophils # (Auto) 7.8H, Lymphocytes # (Auto) 4.4, Monocytes # (Auto) 0.8, 

Eosinophils # (Auto) 1.4H, Basophils # (Auto) 0.1, Blood Urea Nitrogen 14, 

Creatinine 0.77, Sodium Level 138, Potassium Level 4.1, Chloride Level 104, 

Carbon Dioxide Level 25, Calcium Level 8.4L, Glomerular Filtration Rate > 60.0, 

Large Unclassified Cells # 0.3, Large Unclassified Cells % 2.1





Microbiology





 Microbiology


3/19/17 Blood Culture - Preliminary, Resulted


          No Growth after 48 hours. All Specime...


3/19/17 Blood Culture - Preliminary, Resulted


          No Growth after 48 hours. All Specime...


3/18/17 Urine Culture - Final, Complete


          Escherichia Coli





Allergies


Allergies:  


Coded Allergies:  


     Metformin (Unverified  Adverse Reaction, Unknown, "STATES CAN NOT TAKE" 

DOES NOT WORK WELL, 3/17/17)





Current Medications


Current Medications





 Current Medications


Acetaminophen (Tylenol Tab) 650 mg Q4HP  PRN PO MILD PAIN (PS 1-4) Last 

administered on 3/22/17at 04:42;  Start 3/17/17 at 13:45;  Stop 17 at 13:44


Aspirin (Aspirin) 325 mg DAILY PO  Last administered on 3/22/17at 08:15;  Start 

3/18/17 at 09:00;  Stop 17 at 08:59


Atorvastatin Calcium (Lipitor) 80 mg QHS PO  Last administered on 3/21/17at 20:

20;  Start 3/17/17 at 21:00;  Stop 17 at 20:59


Bupropion HCl (Wellbutrin Xl) 150 mg DAILY PO  Last administered on 3/22/17at 08

:18;  Start 3/18/17 at 09:00;  Stop 3/22/17 at 11:23;  Status DC


Buspirone HCl (Buspar) 10 mg Q8HP  PRN PO ANXIETY Last administered on 3/22/

17at 04:39;  Start 3/17/17 at 14:00;  Stop 17 at 13:59


Cefdinir (Omnicef) 300 mg BID PO  Last administered on 3/22/17at 08:15;  Start 3

/20/17 at 09:00;  Stop 3/27/17 at 08:59


Clopidogrel Bisulfate (PLAVix) 75 mg DAILY PO  Last administered on 3/22/17at 08

:16;  Start 3/18/17 at 09:00;  Stop 17 at 08:59


Docusate Sodium (Colace) 100 mg BID PO  Last administered on 3/22/17at 08:18;  

Start 3/17/17 at 21:00;  Stop 17 at 20:59


Glipizide (Glucotrol Xl) 5 mg DAILY@0730 PO  Last administered on 3/22/17at 08:

16;  Start 3/17/17 at 07:30;  Stop 17 at 07:29


Home Med (Med Rec Complete!)  ASDIRECTED XX ;  Start 3/17/17 at 14:15;  Stop 3/

17/17 at 14:15;  Status DC


Losartan Potassium (Cozaar) 25 mg DAILY PO  Last administered on 3/22/17at 08:18

;  Start 3/18/17 at 09:00;  Stop 17 at 08:59


Magnesium Hydroxide (Milk Of Magnesia) 30 ml DAILYPRN  PRN PO CONSTIPATION Last 

administered on 3/17/17at 23:37;  Start 3/17/17 at 13:45;  Stop 17 at 13:44


Pantoprazole Sodium (Protonix) 40 mg DAILY PO  Last administered on 3/22/17at 08

:18;  Start 3/17/17 at 09:00;  Stop 17 at 08:59


Polyethylene Glycol (Miralax) 1 pkt DAILY  PRN PO CONSTIPATION Last 

administered on 3/17/17at 23:38;  Start 3/17/17 at 13:45;  Stop 17 at 13:44


Senna (Senokot) 1 tab QHS PO  Last administered on 3/21/17at 20:20;  Start 3/17/

17 at 21:00;  Stop 17 at 20:59








DONNA CAMPBELL MD Mar 22, 2017 12:55

## 2017-03-23 NOTE — IPNPDOC
Physiatrist Progress Note





PROGRESS NOTE





DATE OF ADMISSION: 3/17/2017





IDENTIFICATION STATEMENT: Patient is a 46-year-old woman with left dorsal basal 

ganglia CVA, right hemiparesis admitted for comprehensive integrated inpatient 

rehabilitation.





PAST MEDICAL HISTORY:


Hypertension


Hyperlipidemia


Diabetes mellitus type 2


Tobacco use


Depression & Anxiety


Thyroid nodule, benign


Low back pain s/p AVIS w/o effect





PAST SURGICAL HISTORY:


Hysterectomy


 x 2


Tubal ligation





ALLERGIES: No known drug allergies





MEDICATIONS:


Aspirin 325 mg daily


Lipitor 80 mg by mouth every afternoon


Prozac 20mg daily


Buspar 10 mg by mouth every 8 hours prn


Plavix 75 mg by mouth daily


Glipizide 5 mg every 24 hours


Losartan 25 mg by mouth daily





SUBJECTIVE:  Patient w/o complaints. No issues overnight. Slept ok. Denies any 

CP, SOB, N/V, C/D, HA





VITAL SIGNS:  97.5 degrees F; 143/84; 67; 97% ra; 19





PHYSICAL EXAMINATION:


GENERAL: Well nourished, well developed, sitting in chair, no acute distress.


HEENT: Normocephalic, atraumatic. Right facial droop (decreased). PERRL, EOMI


CARDIOVASCULAR: S1, S2, regular rate. No LL edema or calf tenderness b/l


LUNGS: CTA b/l, no W/R/R


ABDOMEN: Soft, nontender, nondistended. +normoactive bowel sounds throughout.


NEUROLOGICAL: Alert and oriented x 3. Answers all questions appropriately, 

memory intact. Speech fluent. MMT: 5/5 left upper and lower limbs all major 

muscle groups; 0/5 right UL all major muscle groups except 2/5 shoulder shrug; 2

/5 elbow flexion, 2/5 elbow extension,3-/5 , 4/5 right HF, 3/5 right KE, 3/

5 right KF, 2/5 DF, 1/5 PF, 0/5 right EHL. 


SKIN: No skin breakdown.





LABORATORY DATA: 


3/23/17: reviewed, see below





3/15/17 (from [facility]): WBC 13.7; neutr 46%; HGB 13.9; Hct 39.6; BUN 10, Cr 0.5; 

Na 139; K 4.2; Glu 103





IMAGING:


CT A of the head: The intracranial segments of both the internal carotid, 

anterior, middle and posterior cerebral arteries, and anterior communicating 

artery are patent. The intracranial segments of the vertebral arteries and 

basilar arteries are patent. No aneurysm, hemodynamically significant flow 

stenosis or arteriovenous malformation is identified.





CTA of the neck: There is common origin of the brachiocephalic and left common 

carotid artery. The RN origins of the brachiocephalic, lateral common carotid 

and subclavian arteries are patent. Bilateral cervical internal carotid 

arteries are patent without evidence of hemodynamically significant stenosis. 

Origins of the bilateral vertebral arteries are patent. The right vertebral 

artery is dominant. Bilateral cervical vertebral arteries are patent without 

evidence of significant luminal stenosis or dissection. There is a 1.9 x 1.4 x 

2.7 cm heterogeneous enhancing nodule within the left thyroid lobe, which is 

enlarged.





MR brain 2017: Findings consistent with infarct involving left dorsal 

basal ganglia extending to the corona radiata.





2-D echocardiogram: Negative saline bubble study. Left ventricle size is 

normal. Estimated left ventricular ejection fraction 55-60%. Right ventricular 

systolic systolic function is normal. Aortic valve leaflets are mildly 

thickened. There is trace aortic regurgitation. Both mitral leaflets are mildly 

thickened.





Overnight oximetry: No evidence of desaturation.





CT of the head on 2017: Left basal ganglia stroke.





FUNCTIONAL STATUS, Premorbid: Independent with ADLs and ambulation. Working 

full time at night prior to CVA. Daughter can help her  upon discharge.


   


ASSESSMENT AND PLAN:


1. Left dorsal basal ganglia acute infarct with right hemiparesis and 

dysarthria resulting in decreased mobility and dysfunctional ADLs: Maintain 

patient on aspirin and Plavix for 30 days post d/c from [facility] then switch to ASA 

alone. Continue Lipitor 80 mg daily. Maintain systolic blood pressure less than 

140 mmHg. Continue daily therapies. Rehabilitation nursing for bladder, bowel 

and medication management.





2. UTI, e.coli: Continue Omnicef (d#).





3. Hypertension: Maintain Cozaar 25 mg daily with adjustments as needed.





4. Anxiety/depression: Tolerating SSRI. No reported craving for smoking. 

Continue Prozac.  Maintain BuSpar current dose [Hx: Was on Celexa prior to 

stroke, but changed to Wellbutrin at [facility] to assist in smoking cessation. 

Switched to Prozac 3/22/17].





5. Diabetes mellitus type 2: Continue glipizide 5 mg daily.





6. Bowel: Constipation resolved. Maintain patient on Colace senna scheduled 

along with milk of magnesia and MiraLAX on an as-needed basis.





7. Diet/nutrition: Prealbumin wnl. Mechanical soft, advanced to thin liquids. 

Carbohydrate consistent diet.


/


Vital Signs





 Vital Sign - Last 24 Hours








 3/22/17 3/22/17 3/23/17 3/23/17





 14:00 20:00 06:00 08:29


 


Temp 98.0 98.3 97.5 


 


Pulse 83 90 67 


 


Resp 20 19 19 


 


B/P 149/83 133/77 153/77 143/84


 


Pulse Ox 98 97 97 


 


O2 Delivery Room Air Room Air Room Air 











Laboratory Data


CBC/BMP


 Laboratory Tests


3/23/17 07:45








Calcium Level 8.6, Red Blood Count 5.08, Mean Corpuscular Volume 85.4, Mean 

Corpuscular Hemoglobin 29.0, Mean Corpuscular Hemoglobin Concent 33.9, Red Cell 

Distribution Width 12.1, Neutrophils (%) (Auto) 49.9, Lymphocytes (%) (Auto) 

30.0, Monocytes (%) (Auto) 5.3 H, Eosinophils (%) (Auto) 11.7 H, Basophils (%) (

Auto) 0.7, Neutrophils # (Auto) 5.5, Lymphocytes # (Auto) 3.5, Monocytes # (Auto

) 0.6, Eosinophils # (Auto) 1.3 H, Basophils # (Auto) 0.1


Labs 24H


 Laboratory Tests 2


3/23/17 07:45: 


Anion Gap 6L, White Blood Count 10.9H, Red Blood Count 5.08, Hemoglobin 14.7, 

Hematocrit 43.4, Mean Corpuscular Volume 85.4, Mean Corpuscular Hemoglobin 29.0

, Mean Corpuscular Hemoglobin Concent 33.9, Red Cell Distribution Width 12.1, 

Platelet Count 315, Neutrophils (%) (Auto) 49.9, Lymphocytes (%) (Auto) 30.0, 

Monocytes (%) (Auto) 5.3H, Eosinophils (%) (Auto) 11.7H, Basophils (%) (Auto) 

0.7, Neutrophils # (Auto) 5.5, Lymphocytes # (Auto) 3.5, Monocytes # (Auto) 0.6

, Eosinophils # (Auto) 1.3H, Basophils # (Auto) 0.1, Blood Urea Nitrogen 13, 

Creatinine 0.68, Sodium Level 139, Potassium Level 4.2, Chloride Level 103, 

Carbon Dioxide Level 30, Calcium Level 8.6, Glomerular Filtration Rate > 60.0, 

Large Unclassified Cells # 0.3, Large Unclassified Cells % 2.4





Microbiology





 Microbiology


3/19/17 Blood Culture - Preliminary, Resulted


          No Growth after 72 hours. All specime...


3/19/17 Blood Culture - Preliminary, Resulted


          No Growth after 72 hours. All specime...


3/18/17 Urine Culture - Final, Complete


          Escherichia Coli





Allergies


Allergies:  


Coded Allergies:  


     Metformin (Unverified  Adverse Reaction, Unknown, "STATES CAN NOT TAKE" 

DOES NOT WORK WELL, 3/17/17)





Current Medications


Current Medications





 Current Medications


Acetaminophen (Tylenol Tab) 650 mg Q4HP  PRN PO MILD PAIN (PS 1-4) Last 

administered on 3/22/17at 23:29;  Start 3/17/17 at 13:45;  Stop 17 at 13:44


Aspirin (Aspirin) 325 mg DAILY PO  Last administered on 3/23/17at 08:26;  Start 

3/18/17 at 09:00;  Stop 17 at 08:59


Atorvastatin Calcium (Lipitor) 80 mg QHS PO  Last administered on 3/22/17at 21:

01;  Start 3/17/17 at 21:00;  Stop 17 at 20:59


Bupropion HCl (Wellbutrin Xl) 150 mg DAILY PO  Last administered on 3/22/17at 08

:18;  Start 3/18/17 at 09:00;  Stop 3/22/17 at 11:23;  Status DC


Buspirone HCl (Buspar) 10 mg Q8HP  PRN PO ANXIETY Last administered on 3/23/

17at 03:17;  Start 3/17/17 at 14:00;  Stop 17 at 13:59


Cefdinir (Omnicef) 300 mg BID PO  Last administered on 3/23/17at 08:26;  Start 3

/20/17 at 09:00;  Stop 3/27/17 at 08:59


Clopidogrel Bisulfate (PLAVix) 75 mg DAILY PO  Last administered on 3/23/17at 08

:26;  Start 3/18/17 at 09:00;  Stop 17 at 08:59


Docusate Sodium (Colace) 100 mg BID PO  Last administered on 3/23/17at 08:25;  

Start 3/17/17 at 21:00;  Stop 17 at 20:59


Fluoxetine HCl (PROzac) 20 mg DAILY PO  Last administered on 3/23/17at 08:25;  

Start 3/23/17 at 09:00;  Stop 17 at 08:59


Glipizide (Glucotrol Xl) 5 mg DAILY@0730 PO  Last administered on 3/23/17at 08:

25;  Start 3/17/17 at 07:30;  Stop 17 at 07:29


Home Med (Med Rec Complete!)  ASDIRECTED XX ;  Start 3/17/17 at 14:15;  Stop 3/

17/17 at 14:15;  Status DC


Losartan Potassium (Cozaar) 25 mg DAILY PO  Last administered on 3/23/17at 08:29

;  Start 3/18/17 at 09:00;  Stop 17 at 08:59


Magnesium Hydroxide (Milk Of Magnesia) 30 ml DAILYPRN  PRN PO CONSTIPATION Last 

administered on 3/17/17at 23:37;  Start 3/17/17 at 13:45;  Stop 17 at 13:44


Pantoprazole Sodium (Protonix) 40 mg DAILY PO  Last administered on 3/23/17at 08

:25;  Start 3/17/17 at 09:00;  Stop 17 at 08:59


Polyethylene Glycol (Miralax) 1 pkt DAILY  PRN PO CONSTIPATION Last 

administered on 3/17/17at 23:38;  Start 3/17/17 at 13:45;  Stop 17 at 13:44


Senna (Senokot) 1 tab QHS PO  Last administered on 3/21/17at 20:20;  Start 3/17/

17 at 21:00;  Stop 17 at 20:59








DONNA CAMPBELL MD Mar 23, 2017 11:47

## 2017-03-24 NOTE — IPNPDOC
Physiatrist Progress Note





PROGRESS NOTE





DATE OF ADMISSION: 3/17/2017





IDENTIFICATION STATEMENT: Patient is a 46-year-old woman with left dorsal basal 

ganglia CVA, right hemiparesis admitted for comprehensive integrated inpatient 

rehabilitation.





PAST MEDICAL HISTORY:


Hypertension


Hyperlipidemia


Diabetes mellitus type 2


Tobacco use


Depression & Anxiety


Thyroid nodule, benign


Low back pain s/p AVIS w/o effect





PAST SURGICAL HISTORY:


Hysterectomy


 x 2


Tubal ligation





ALLERGIES: No known drug allergies





MEDICATIONS:


Aspirin 325 mg daily


Lipitor 80 mg by mouth every afternoon


Prozac 20mg daily


Buspar 10 mg by mouth every 8 hours prn


Plavix 75 mg by mouth daily


Glipizide 5 mg every 24 hours


Losartan 25 mg by mouth daily





SUBJECTIVE:  Patient w/o complaints. Feels well. No issues overnight. Denies 

any CP, SOB, N/V, C/D, HA. Has had BMs, some mild stomach cramping. Seems to be 

resolving.





VITAL SIGNS:  97.3 degrees F; 132/71; 70; 99% ra; rr 18





PHYSICAL EXAMINATION:


GENERAL: Well nourished, well developed, sitting in chair, no acute distress.


HEENT: Normocephalic, atraumatic. Right facial droop (decreased). PERRL, EOMI


CARDIOVASCULAR: S1, S2, regular rate. No LL edema or calf tenderness b/l


LUNGS: CTA b/l, no W/R/R


ABDOMEN: Soft, nontender, nondistended. +normoactive bowel sounds throughout.


NEUROLOGICAL: Alert and oriented x 3. Answers all questions appropriately, 

memory intact. Speech fluent. MMT: 5/5 left upper and lower limbs all major 

muscle groups; 3/5 right shoulder shrug, abduction and forward flexion;  2/5 

elbow flexion, 2/5 elbow extension,3-/5 ; 4+/5 right HF, 4/5 right KE, 4/5 

right KF, 2/5 DF, 2/5 PF, 2/5 right EHL. 


SKIN: No skin breakdown.





LABORATORY DATA: 


3/23/17: reviewed, see below





FUNCTIONAL STATUS, Premorbid: Independent with ADLs and ambulation. Working 

full time at night prior to CVA. Daughter can help her / upon discharge.


   


ASSESSMENT AND PLAN:


1. Left dorsal basal ganglia acute infarct with right hemiparesis and 

dysarthria resulting in decreased mobility and dysfunctional ADLs: Maintain 

patient on aspirin and Plavix for 30 days post d/c from [facility] then switch to ASA 

alone. Continue Lipitor 80 mg daily. Maintain systolic blood pressure less than 

140 mmHg. Patient making good progress. Continue daily therapies. 

Rehabilitation nursing for bladder, bowel and medication management.





2. UTI, e.coli: Continue Omnicef (d#5/).





3. Hypertension: Maintain Cozaar 25 mg daily with adjustments as needed.





4. Anxiety/depression: Tolerating SSRI. No reported craving for smoking. 

Continue Prozac.  Maintain BuSpar current dose [Hx: Was on Celexa prior to 

stroke, but changed to Wellbutrin at [facility] to assist in smoking cessation. D/cd 

Wellbutrin and started Prozac 3/22/17].





5. Diabetes mellitus type 2: Continue glipizide 5 mg daily.





6. Bowel: Constipation resolved. Maintain patient on Colace senna scheduled 

along with milk of magnesia and MiraLAX on an as-needed basis.





7. Diet/nutrition: Prealbumin wnl. Mechanical soft, advanced to thin liquids. 

Carbohydrate consistent diet.


/


Vital Signs





 Vital Sign - Last 24 Hours








 3/23/17 3/23/17 3/24/17 3/24/17





 14:00 20:00 06:00 08:56


 


Temp 98.4 98.5 97.3 


 


Pulse 75 100 70 


 


Resp  18 


 


B/P 144/73 137/87 132/71 132/71


 


Pulse Ox 99 97 99 


 


O2 Delivery Room Air Room Air Room Air 











Microbiology





 Microbiology


3/19/17 Blood Culture - Preliminary, Resulted


          No Growth after 72 hours. All specime...


3/19/17 Blood Culture - Preliminary, Resulted


          No Growth after 72 hours. All specime...


3/18/17 Urine Culture - Final, Complete


          Escherichia Coli





Allergies


Allergies:  


Coded Allergies:  


     Metformin (Unverified  Adverse Reaction, Unknown, "STATES CAN NOT TAKE" 

DOES NOT WORK WELL, 3/17/17)





Current Medications


Current Medications





 Current Medications


Acetaminophen (Tylenol Tab) 650 mg Q4HP  PRN PO MILD PAIN (PS 1-4) Last 

administered on 3/24/17at 02:50;  Start 3/17/17 at 13:45;  Stop 17 at 13:44


Aspirin (Aspirin) 325 mg DAILY PO  Last administered on 3/24/17at 08:56;  Start 

3/18/17 at 09:00;  Stop 17 at 08:59


Atorvastatin Calcium (Lipitor) 80 mg QHS PO  Last administered on 3/23/17at 22:

13;  Start 3/17/17 at 21:00;  Stop 17 at 20:59


Bupropion HCl (Wellbutrin Xl) 150 mg DAILY PO  Last administered on 3/22/17at 08

:18;  Start 3/18/17 at 09:00;  Stop 3/22/17 at 11:23;  Status DC


Buspirone HCl (Buspar) 10 mg Q8HP  PRN PO ANXIETY Last administered on 3/24/

17at 02:49;  Start 3/17/17 at 14:00;  Stop 17 at 13:59


Cefdinir (Omnicef) 300 mg BID PO  Last administered on 3/24/17at 08:57;  Start 3

/20/17 at 09:00;  Stop 3/27/17 at 08:59


Clopidogrel Bisulfate (PLAVix) 75 mg DAILY PO  Last administered on 3/24/17at 08

:57;  Start 3/18/17 at 09:00;  Stop 17 at 08:59


Docusate Sodium (Colace) 100 mg BID PO  Last administered on 3/24/17at 08:56;  

Start 3/17/17 at 21:00;  Stop 17 at 20:59


Fluoxetine HCl (PROzac) 20 mg DAILY PO  Last administered on 3/24/17at 08:57;  

Start 3/23/17 at 09:00;  Stop 17 at 08:59


Glipizide (Glucotrol Xl) 5 mg DAILY@0730 PO  Last administered on 3/24/17at 08:

56;  Start 3/17/17 at 07:30;  Stop 17 at 07:29


Home Med (Med Rec Complete!)  ASDIRECTED XX ;  Start 3/17/17 at 14:15;  Stop 3/

17/17 at 14:15;  Status DC


Losartan Potassium (Cozaar) 25 mg DAILY PO  Last administered on 3/24/17at 08:56

;  Start 3/18/17 at 09:00;  Stop 17 at 08:59


Magnesium Hydroxide (Milk Of Magnesia) 30 ml DAILYPRN  PRN PO CONSTIPATION Last 

administered on 3/17/17at 23:37;  Start 3/17/17 at 13:45;  Stop 17 at 13:44


Pantoprazole Sodium (Protonix) 40 mg DAILY PO  Last administered on 3/24/17at 08

:57;  Start 3/17/17 at 09:00;  Stop 17 at 08:59


Polyethylene Glycol (Miralax) 1 pkt DAILY  PRN PO CONSTIPATION Last 

administered on 3/17/17at 23:38;  Start 3/17/17 at 13:45;  Stop 17 at 13:44


Senna (Senokot) 1 tab QHS PO  Last administered on 3/21/17at 20:20;  Start 3/17/

17 at 21:00;  Stop 17 at 20:59








DONNA CAMPBELL MD Mar 24, 2017 10:11

## 2017-03-28 NOTE — IPNPDOC
Physiatrist Progress Note


DATE OF SERVICE:  





DATE OF ADMISSION: Mar 17, 2017 at 13:04 


INPATIENT REHABILITATION ADMISSION DAY: #[11] 





HISTORY OF PRESENT ILLNESS: Patient is a 46-year-old right hand dominant woman 

with multiple medical comorbidities who was admitted to [facility] on  with acute onset right hemiparesis. Patient describes episode of 

dizziness and right upper/lower extremity numbness while at work which 

resolved. Symptoms recurred shortly thereafter and were now accompanied by 

slurred speech. EMS was activated and patient was brought to [facility]. In transit to the hospital symptoms evolved to include right 

hemiparesis. She was found to have a left dorsal basal ganglia acute infarct. 

She was started on dual antiplatelet platelet therapy (aspirin and Plavix). 

Echocardiogram was negative for any thrombus. CT scan of the head and neck was 

without significant atherosclerotic disease. During her hospital stay, her 

neurologic symptoms waxed and waned for which she underwent a repeat CT which 

was negative for any hemorrhage. Due to decline in the patients baseline 

functional status, recommendation was for acute rehabilitation. On 2017 

the patient was deemed stable for discharge to [facility] 

inpatient rehabilitation unit.





PAST MEDICAL HISTORY:


Hypertension


Hyperlipidemia


Diabetes mellitus type 2


Tobacco use


Depression & Anxiety


Thyroid nodule, benign


Low back pain s/p AVIS w/o effect





PAST SURGICAL HISTORY:


Hysterectomy


 x 2


Tubal ligation





ALLERGIES: See Below





MEDICATIONS ON ADMISSION: Reviewed, see below.





SUBJECTIVE: [Middle-age white female with moderately dense right hemiparesis 

effecting right upper extremity more than lower extremity is seen today sitting 

up in her room after therapy. She denies a pain or problems in general and 

reports being in good spirits seeing progress in her regarding gaining right 

upper and lower extremity movement.]





VITAL SIGNS: See below.





PHYSICAL EXAMINATION:


GENERAL: [Short overweight middle-age white female in no acute distress but 

with mild-to-moderate right hemiparesis greater in the upper extremity than the 

lower extremity and the return of tone to the right upper extremity resulting 

in some mild fisting of the right hand. Patient able to get some mild opening 

of the right hand by focusing on. She appears to be in good spirits and speech 

is fairly fluent and is appropriate.]


HEENT: Normocephalic/atraumatic with minimal right facial droop.


CARDIOVASCULAR: Regular rate and rhythm with normal S1 and S2 without S3-S4 

murmurs or rubs in 2 out of 4 bilaterally radial pulses. Good capillary 

perfusion found in nailbeds of hands.


LUNGS: All fields clear to auscultation without wheezes rhonchi or rales 

present.


ABDOMEN: Obese and benign with bowel sounds in all quadrants normal intensity.


NEUROLOGICAL: As noted above with 3 minus out of 5 right shoulder 2 to -5 right 

hand strength and 3 minus knee and ankle strength on the right and 3 out of 5 

right hip with 5 out of 5 left upper and lower extremity motor.


SKIN: Intact with good color and turgor





LABORATORY DATA: Reviewed, see below.





IMAGING:


None





ASSESSMENT AND PLAN:


1. Rehabilitation of left CVA with right hemiparesis: Patient progressing and 

recovery of right upper and lower extremity with physical and occupational 

therapy. Patient is in good spirits and motivated to therapy anticipated 

discharge is Friday.





2. Atherosclerosis cardiovascular disease: Patient mildly hypertensive but 

overall stable. No change in care planed at this time.





3. Type 2 diabetes: Stable at this time continue current regimen.





INTERDISCIPLINARY CARE AND DISCHARGE PLANNIN. Rehabilitation of left CVA: As noted above patient progressing and 

anticipate discharge from this facility on Friday.


2. Type 2 diabetes: Continuing current regimen.


3. Hypertension: Continue current regimen of medications. Patient mildly 

elevated but this has been associated with their recovery so will not try to 

normalize blood pressure at this time.


Vital Signs





 Vital Sign - Last 24 Hours








 3/27/17 3/28/17 3/28/17 3/28/17





 20:00 06:00 08:11 14:00


 


Temp 97.8 98.1  98.2


 


Pulse 89 81  72


 


Resp 18 18  18


 


B/P 139/90 133/84 133/84 114/63


 


Pulse Ox 98 98  98


 


O2 Delivery Room Air Room Air  Room Air











Microbiology





 Microbiology


3/19/17 Blood Culture - Final, Complete


          NO GROWTH AFTER 5 DAYS


3/19/17 Blood Culture - Final, Complete


          NO GROWTH AFTER 5 DAYS


3/18/17 Urine Culture - Final, Complete


          Escherichia Coli





Allergies


Allergies:  


Coded Allergies:  


     Metformin (Unverified  Adverse Reaction, Unknown, "STATES CAN NOT TAKE" 

DOES NOT WORK WELL, 3/17/17)





Current Medications


Current Medications





 Current Medications


Acetaminophen (Tylenol Tab) 650 mg Q4HP  PRN PO MILD PAIN (PS 1-4) Last 

administered on 3/27/17at 21:51;  Start 3/17/17 at 13:45;  Stop 17 at 13:44


Aspirin (Aspirin) 325 mg DAILY PO  Last administered on 3/28/17at 08:10;  Start 

3/18/17 at 09:00;  Stop 17 at 08:59


Atorvastatin Calcium (Lipitor) 80 mg QHS PO  Last administered on 3/27/17at 20:

30;  Start 3/17/17 at 21:00;  Stop 17 at 20:59


Bupropion HCl (Wellbutrin Xl) 150 mg DAILY PO  Last administered on 3/22/17at 08

:18;  Start 3/18/17 at 09:00;  Stop 3/22/17 at 11:23;  Status DC


Buspirone HCl (Buspar) 10 mg Q8HP  PRN PO ANXIETY Last administered on 3/27/

17at 21:51;  Start 3/17/17 at 14:00;  Stop 17 at 13:59


Cefdinir (Omnicef) 300 mg BID PO  Last administered on 3/28/17at 08:11;  Start 3

/20/17 at 09:00;  Stop 4/3/17 at 08:59


Clopidogrel Bisulfate (PLAVix) 75 mg DAILY PO  Last administered on 3/28/17at 08

:11;  Start 3/18/17 at 09:00;  Stop 17 at 08:59


Docusate Sodium (Colace) 100 mg BID PO  Last administered on 3/28/17at 08:11;  

Start 3/17/17 at 21:00;  Stop 17 at 20:59


Fluoxetine HCl (PROzac) 20 mg DAILY PO  Last administered on 3/28/17at 08:11;  

Start 3/23/17 at 09:00;  Stop 17 at 08:59


Glipizide (Glucotrol Xl) 5 mg DAILY@0730 PO  Last administered on 3/28/17at 08:

10;  Start 3/17/17 at 07:30;  Stop 17 at 07:29


Home Med (Med Rec Complete!)  ASDIRECTED XX ;  Start 3/17/17 at 14:15;  Stop 3/

17/17 at 14:15;  Status DC


Losartan Potassium (Cozaar) 25 mg DAILY PO  Last administered on 3/28/17at 08:11

;  Start 3/18/17 at 09:00;  Stop 17 at 08:59


Magnesium Hydroxide (Milk Of Magnesia) 30 ml DAILYPRN  PRN PO CONSTIPATION Last 

administered on 3/17/17at 23:37;  Start 3/17/17 at 13:45;  Stop 17 at 13:44


Miscellaneous (Unresolved Clarification Entry) SEE LABEL COMMENTS UNRESOLVED XX 

;  Start 3/27/17 at 00:01;  Stop 3/27/17 at 10:51;  Status DC


Pantoprazole Sodium (Protonix) 40 mg DAILY PO  Last administered on 3/28/17at 08

:11;  Start 3/17/17 at 09:00;  Stop 17 at 08:59


Polyethylene Glycol (Miralax) 1 pkt DAILY  PRN PO CONSTIPATION Last 

administered on 3/17/17at 23:38;  Start 3/17/17 at 13:45;  Stop 17 at 13:44


Senna (Senokot) 1 tab QHS PO  Last administered on 3/21/17at 20:20;  Start 3/17/

17 at 21:00;  Stop 17 at 20:59








KATHRINE GRAVES MD Mar 28, 2017 16:36

## 2017-03-29 NOTE — IPNPDOC
Physiatrist Progress Note


DATE OF SERVICE:  





DATE OF ADMISSION: Mar 17, 2017 at 13:04 


INPATIENT REHABILITATION ADMISSION DAY: #12





SUBJECTIVE: Patient is a 46-year-old white female with left cerebrovascular 

accident with right hemiparesis. Patient reports doing well today with no fever 

chills pain or other complaints. She does request a letter for work to include 

that her ongoing convalescent will take more than 30 days.





ALLERGIES: See Below





MEDICATIONS: See below.





VITAL SIGNS: Please see below.


PHYSICAL EXAMINATION:


GENERAL: Short overweight middle-aged white female who is alert and oriented 

4. Speech is clear coherent and appropriate affect is pleasant and cooperative. 

Memory is intact. Patient with right hemiparesis was aware of space around her 

in all planes. Today her right hand is open as opposed to half clenched 

yesterday. Patient showing more voluntary range of motion opening and closing 

her right hand. Also able to voluntarily forward flex and abduct right shoulder 

110 & 100 respectively. This is about 10 improvement over yesterday.


HEENT: Normocephalic atraumatic with no facial droop pain. I tracking is 

conjugate, pupils are equal round and reactive to light.


CARDIOVASCULAR: Regular rate and rhythm with normal S1 and S2 without S3-S4 

murmurs or rubs. 2+ for bilateral radial pulses.


LUNGS: All fields clear to auscultation without wheezes rhonchi or rales.


ABDOMEN: Mildly obese with no tenderness. Bowel sounds were normal in all 

quadrants.


NEUROLOGICAL: As noted above





LABORATORY DATA: Reviewed. Please see below.





ASSESSMENT AND PLAN:


1. Rehabilitation of CVA with right hemiparesis: Patient continues to work with 

physical and occupational therapy demonstrating increasing control and 

endurance using the right upper and lower extremity continues to have more 

involvement in the right upper extremity in the lower extremity. I will go 

ahead and rewrite the letter for her employer. She will be requiring more than 

30 days of convalescence before she would be able to return to her regular work.





TIME SPENT: 30 minutes.


Allergies


Coded Allergies:  


     Metformin (Unverified  Adverse Reaction, Unknown, "STATES CAN NOT TAKE" 

DOES NOT WORK WELL, 3/17/17)





Vital Signs





 Vital Signs








  Date Time  Temp Pulse Resp B/P Pulse Ox O2 Delivery O2 Flow Rate FiO2


 


3/29/17 09:18    134/76    


 


3/29/17 09:00      Room Air  


 


3/29/17 06:00 97.3 81 18  98   











Microbiology





 Microbiology


3/19/17 Blood Culture - Final, Complete


          NO GROWTH AFTER 5 DAYS


3/19/17 Blood Culture - Final, Complete


          NO GROWTH AFTER 5 DAYS





Current Medications


Current Medications





 Current Medications


Acetaminophen (Tylenol Tab) 650 mg Q4HP  PRN PO MILD PAIN (PS 1-4) Last 

administered on 3/28/17at 21:16;  Start 3/17/17 at 13:45;  Stop 4/16/17 at 13:44


Aspirin (Aspirin) 325 mg DAILY PO  Last administered on 3/29/17at 09:18;  Start 

3/18/17 at 09:00;  Stop 4/17/17 at 08:59


Atorvastatin Calcium (Lipitor) 80 mg QHS PO  Last administered on 3/28/17at 21:

15;  Start 3/17/17 at 21:00;  Stop 4/16/17 at 20:59


Bupropion HCl (Wellbutrin Xl) 150 mg DAILY PO  Last administered on 3/22/17at 08

:18;  Start 3/18/17 at 09:00;  Stop 3/22/17 at 11:23;  Status DC


Buspirone HCl (Buspar) 10 mg Q8HP  PRN PO ANXIETY Last administered on 3/28/

17at 21:15;  Start 3/17/17 at 14:00;  Stop 4/16/17 at 13:59


Cefdinir (Omnicef) 300 mg BID PO  Last administered on 3/29/17at 09:18;  Start 3

/20/17 at 09:00;  Stop 4/3/17 at 08:59


Clopidogrel Bisulfate (PLAVix) 75 mg DAILY PO  Last administered on 3/29/17at 09

:17;  Start 3/18/17 at 09:00;  Stop 4/17/17 at 08:59


Docusate Sodium (Colace) 100 mg BID PO  Last administered on 3/29/17at 09:17;  

Start 3/17/17 at 21:00;  Stop 4/16/17 at 20:59


Fluoxetine HCl (PROzac) 20 mg DAILY PO  Last administered on 3/29/17at 09:17;  

Start 3/23/17 at 09:00;  Stop 4/22/17 at 08:59


Glipizide (Glucotrol Xl) 5 mg DAILY@0730 PO  Last administered on 3/29/17at 09:

17;  Start 3/17/17 at 07:30;  Stop 4/16/17 at 07:29


Home Med (Med Rec Complete!)  ASDIRECTED XX ;  Start 3/17/17 at 14:15;  Stop 3/

17/17 at 14:15;  Status DC


Losartan Potassium (Cozaar) 25 mg DAILY PO  Last administered on 3/29/17at 09:18

;  Start 3/18/17 at 09:00;  Stop 4/17/17 at 08:59


Magnesium Hydroxide (Milk Of Magnesia) 30 ml DAILYPRN  PRN PO CONSTIPATION Last 

administered on 3/17/17at 23:37;  Start 3/17/17 at 13:45;  Stop 4/16/17 at 13:44


Miscellaneous (Unresolved Clarification Entry) SEE LABEL COMMENTS UNRESOLVED XX 

;  Start 3/27/17 at 00:01;  Stop 3/27/17 at 10:51;  Status DC


Pantoprazole Sodium (Protonix) 40 mg DAILY PO  Last administered on 3/29/17at 09

:17;  Start 3/17/17 at 09:00;  Stop 4/16/17 at 08:59


Polyethylene Glycol (Miralax) 1 pkt DAILY  PRN PO CONSTIPATION Last 

administered on 3/17/17at 23:38;  Start 3/17/17 at 13:45;  Stop 4/16/17 at 13:44


Senna (Senokot) 1 tab QHS PO  Last administered on 3/21/17at 20:20;  Start 3/17/

17 at 21:00;  Stop 4/16/17 at 20:59








KATHRINE GRAVES MD Mar 29, 2017 12:03

## 2017-03-30 NOTE — IPNPDOC
Physiatrist Progress Note


DATE OF SERVICE:  03/30/2017





DATE OF ADMISSION: Mar 17, 2017 at 13:04 


INPATIENT REHABILITATION ADMISSION DAY: #13





SUBJECTIVE: Patient is a 46-year-old white female with left CVA and right 

hemiparesis. Patient reports things went well in the department yesterday and 

last night and is encouraged for return to home. She denies any pain or other 

problems at this time.





ALLERGIES: See Below





MEDICATIONS ON ADMISSION: Reviewed, see below.





OBJECTIVE: 


VITAL SIGNS: Please see below.


PHYSICAL EXAMINATION:


GENERAL: Short moderately overweight early middle-aged white female with right 

hemiparesis and tone in the right upper extremity who otherwise is in no acute 

distress.


HEENT: Normocephalic/atraumatic no facial asymmetry.


CARDIOVASCULAR: Regular rate and rhythm with normal S1-S2 without S3-S4 murmurs 

or rubs and 2+4 radial pulses.


LUNGS: Michelle now feels auscultation.


ABDOMEN: Mildly obese, nontender, normal bowel sounds in all quadrants.


NEUROLOGICAL: Patient is alert and well oriented speech is clear coherent and 

appropriate, affect is pleasant and cooperative with the little bit of 

anxiousness. Patient ambulating with quad cane in left hand showing fairly good 

balance and safety techniques and good endurance and mobility.


SKIN: Intact





LABORATORY DATA: Reviewed. Please see below.





MICROBIOLOGY: Please see below.





IMAGING: No new





Echocardiogram: No new.





DVT prophylaxis ordered?: Patient is on aspirin and clopid.








ASSESSMENT AND PLAN:


1. Rehabilitation of left CVA with right hemiparesis: Patient doing well and 

did well in apartment trial so will plan discharge tomorrow with home services 

to include PT and OT. Patient is anticipated to require more than a month for a 

possibility of return to her prior job. Down the line vocational rehabilitation 

consultation with the [facility] would probably be a good option.





2. Anxiety and depression: Patient doing well with current medications.





3. Atherosclerotic cardiovascular disease: Blood pressure good today for now 

will continue cardiac diet and current medications.








TIME SPENT: Including team rounds patient evaluation and documentation greater 

than 30 Minutes.


Allergies


Coded Allergies:  


     Metformin (Unverified  Adverse Reaction, Unknown, "STATES CAN NOT TAKE" 

DOES NOT WORK WELL, 3/17/17)





Vital Signs





 Vital Signs








  Date Time  Temp Pulse Resp B/P Pulse Ox O2 Delivery O2 Flow Rate FiO2


 


3/30/17 14:00 99.5 71 18 123/58 98 Room Air  











Current Medications


Current Medications





 Current Medications


Acetaminophen (Tylenol Tab) 650 mg Q4HP  PRN PO MILD PAIN (PS 1-4) Last 

administered on 3/29/17at 21:45;  Start 3/17/17 at 13:45;  Stop 4/16/17 at 13:44


Aspirin (Aspirin) 325 mg DAILY PO  Last administered on 3/30/17at 09:09;  Start 

3/18/17 at 09:00;  Stop 4/17/17 at 08:59


Atorvastatin Calcium (Lipitor) 80 mg QHS PO  Last administered on 3/29/17at 21:

44;  Start 3/17/17 at 21:00;  Stop 4/16/17 at 20:59


Bupropion HCl (Wellbutrin Xl) 150 mg DAILY PO  Last administered on 3/22/17at 08

:18;  Start 3/18/17 at 09:00;  Stop 3/22/17 at 11:23;  Status DC


Buspirone HCl (Buspar) 10 mg Q8HP  PRN PO ANXIETY Last administered on 3/28/

17at 21:15;  Start 3/17/17 at 14:00;  Stop 4/16/17 at 13:59


Cefdinir (Omnicef) 300 mg BID PO  Last administered on 3/30/17at 09:09;  Start 3

/20/17 at 09:00;  Stop 4/3/17 at 08:59


Clopidogrel Bisulfate (PLAVix) 75 mg DAILY PO  Last administered on 3/30/17at 09

:09;  Start 3/18/17 at 09:00;  Stop 4/17/17 at 08:59


Clotrimazole (Lotrimin 1% Vag) 1 APPLICATORFUL QHS PV ;  Start 3/30/17 at 21:00

;  Stop 4/6/17 at 09:00


Docusate Sodium (Colace) 100 mg BID PO  Last administered on 3/30/17at 09:10;  

Start 3/17/17 at 21:00;  Stop 4/16/17 at 20:59


Fluoxetine HCl (PROzac) 20 mg DAILY PO  Last administered on 3/30/17at 09:10;  

Start 3/23/17 at 09:00;  Stop 4/22/17 at 08:59


Glipizide (Glucotrol Xl) 5 mg DAILY@0730 PO  Last administered on 3/30/17at 09:

10;  Start 3/17/17 at 07:30;  Stop 4/16/17 at 07:29


Home Med (Med Rec Complete!)  ASDIRECTED XX ;  Start 3/17/17 at 14:15;  Stop 3/

17/17 at 14:15;  Status DC


Losartan Potassium (Cozaar) 25 mg DAILY PO  Last administered on 3/30/17at 09:10

;  Start 3/18/17 at 09:00;  Stop 4/17/17 at 08:59


Magnesium Hydroxide (Milk Of Magnesia) 30 ml DAILYPRN  PRN PO CONSTIPATION Last 

administered on 3/17/17at 23:37;  Start 3/17/17 at 13:45;  Stop 4/16/17 at 13:44


Miscellaneous (Unresolved Clarification Entry) SEE LABEL COMMENTS UNRESOLVED XX 

;  Start 3/27/17 at 00:01;  Stop 3/27/17 at 10:51;  Status DC


Pantoprazole Sodium (Protonix) 40 mg DAILY PO  Last administered on 3/30/17at 09

:10;  Start 3/17/17 at 09:00;  Stop 4/16/17 at 08:59


Polyethylene Glycol (Miralax) 1 pkt DAILY  PRN PO CONSTIPATION Last 

administered on 3/17/17at 23:38;  Start 3/17/17 at 13:45;  Stop 4/16/17 at 13:44


Senna (Senokot) 1 tab QHS PO  Last administered on 3/21/17at 20:20;  Start 3/17/

17 at 21:00;  Stop 4/16/17 at 20:59








KATHRINE GRAVES MD Mar 30, 2017 17:38

## 2017-03-31 NOTE — PMRDS
DATE OF ADMISSION:  03/17/2017

DATE OF DISCHARGE:  03/31/2017

 

DIAGNOSES:

1.  Rehabilitation of dorsal basal ganglia cerebrovascular accident (CVA) with

right hemiparesis and elevated right upper extremity tone.

2.  Hypertension.

3.  Hyperlipidemia.

4.  Diabetes mellitus type 2.

5.  History of tobacco use.

6.  Depression and anxiety.

7.  Chronic low back pain.

 

HISTORY:  Patient is a 46-year-old white female who is right-hand dominant, with

multiple medical comorbidities noted above, including hypertension,

hyperlipidemia, diabetes, and tobacco dependence, and was admitted with the onset

of dizziness and right upper extremity and lower extremity numbness and weakness

on 03/14/2017 to [facility].  She was found to have dorsal basal

ganglia cerebrovascular accident (CVA), was stabilized and transferred to the

[facility] acute rehabilitation unit on 03/17/2017 for a trial of

neurological rehabilitation.  Patient participated in a program of

physical/occupational therapy and also had speech/language pathology assessment

here.  Overall, patient made progress to becoming modified independent in

activities of daily living and mobility, as long as they were one-handed.  She

has gained about 70% of her right upper extremity range of motion with better

strength and mobility at the shoulder than distal in the hand; however, now able

to overcome hand tone, but cannot do dexterous task with the right hand and being

right-hand dominant, is limited in two-hand tasks in activities of daily living

and vocationally at this time.  Patient is currently ambulating greater than 100

yards using walker and requires a shower bench or shower chair for bathing.

Please see functional independence measure (FIM) scores for change of function

from admission to discharge to home.

 

No procedures were performed during this admission.

 

HOSPITAL COURSE:  As noted above.

 

PHYSICAL EXAMINATION:

Patient is an average height, stocky build, early middle-age white female who is

alert and oriented to person, place, time and situation.

Speech is clear and coherent and purposeful.  There is no facial drooping.

Tongue is midline.  She has no dysarthria at discharge.  Memory is good.  Safety

awareness is fair to good.  Patient having done well with trial in the apartment.

Balance is fair to good.  Motor is 5/5 left upper and lower extremity with full

active range of motion and hand can fully flex and extend, but has difficulty in

doing this actively on demand.  Elbow has 80% of full range.  Should has full

internal rotation, abduction; however, adduction and poor flexion are limited to

100-110 degrees actively and therefore, motor on the right shoulder is 3-/5.

Right motor extremity strength about 4-4+/5.

HEENT:  Normocephalic, atraumatic.

LUNGS:  Clear in all fields to auscultation.

CORONARY:  Regular rate and rhythm with normal S1, S2.  2/4 radial pulses.

ABDOMEN:  Mildly obese with no tenderness or rebound.  Bowel sounds are present

in all quadrants.

 

DISCHARGE MEDICATIONS:

-  [id number] mg TROC, take five times per day for vaginal candidiasis.

-  Senokot 8.6 mg at bedtime

-  Tylenol 325 mg two tablets by mouth every 4 hours as needed for pain or fever

-  aspirin 325 mg by mouth daily for anticoagulation

-  atorvastatin/calcium 80 mg at bedtime

-  bupropion hydrochloride  mg daily

-  BuSpar 10 mg by mouth three times a day

-  Plavix 75 mg by mouth daily

-  glipizide ER 5 mg daily

-  losartan potassium 25 mg daily

 

COMPLICATIONS:  None.

 

DISPOSITION:  Patient is discharged to home.  She is not able to drive and lives

alone, but will have her daughter visiting and checking on her and providing help

during the day.  Patient is not able to return to work until she regains right

upper extremity function.  Home care has been scheduled for skilled nursing,

physical and occupational therapy.  Patient will use a walker for mobility and

the shower chair for hygiene.  Patient to follow up with her primary care

provider within one to two weeks and the stroke group at [facility] within the next

month.  Appointments have been scheduled for those and given to the patient.

## 2017-04-11 NOTE — REP
THYROID ULTRASOUND:

 

Real-time sonographic evaluation of thyroid performed and compared to prior study

of 03/04/2014.

 

Right lobe measures 4.5 x 1.7 x 1.6 cm and left lobe 4.7 x 2.2 x 1.9 cm. A small

solid-appearing nodule in the right mid aspect measures 6 x 3 x 5 mm, essentially

unchanged. Complex solid nodule with cystic components in the left lower pole is

essentially unchanged measuring 3.0 x 2.2 x 1.7 cm. Tiny cyst in the left upper

pole measures 3 mm in diameter.

 

IMPRESSION:

 

Similar findings compared to the prior study. Bilateral nodules appear stable.

 

 

Signed by

Wilmer Almanzar MD 04/11/2017 04:24 P

## 2017-04-14 NOTE — REP
MRI brain without contrast:

 

History:  Left hand numbness now resolved.  Recent stroke.  Recent prior studies

in [place].

 

Comparison is made with today's CT study showing low density lesion in the left

basal ganglia and left periventricular white matter.

 

Technique:  Axial and sagittal imaging planes are utilized for T1 and T2-weighted

scans.  Sequences include spin-echo, fast spin echo, FLAIR, and diffusion

weighted sequences.

 

MRI findings:  No bony calvarial lesion is seen.  Craniocervical junction and

upper cervical cord are normal in appearance.  There is no evidence of

intracranial hemorrhage.  The low density lesion seen on CT scan corresponds to a

T2 hyperintense lesion in the left basal ganglia extending up into the

periventricular white matter of the left parietal lobe on MR imaging.  This is

new when compared with the 02/15/2012 prior MRI study.  There is evidence of some

of restricted diffusion on diffusion scanning in this lesion.  There is slight

signal loss on ADC images consistent with subacute infarction in this lesion.

There is no other evidence of restricted diffusion on diffusion-weighted scans to

suggest a new infarction.  There is no evidence of hemorrhage.  No mass,

extra-axial fluid collection or midline shift is seen.  No mass effect is seen.

 

Impression:

 

Findings compatible with recent infarct in the left basal ganglia and

periventricular white matter of the left parietal lobe.  No acute or new lesions

seen.

 

 

Signed by

Mike Nguyen MD 04/14/2017 06:01 P

## 2017-04-14 NOTE — REP
Noncontrast CT study of the brain.

 

History:  CVA.

 

Comparison MRI study of the brain is from February 15, 2012.

 

Findings:  Digital lateral  radiograph is unremarkable.  Bone window

settings show an intact bony calvarium.  No skull fractures seen.  No bony

destructive lesion is noted.  The visualized paranasal sinuses are clear.  No

intraorbital abnormality is seen.

 

On soft tissue window settings, the lateral, third, and fourth ventricles are

normal in size and position.  There is no evidence of intracranial hemorrhage.

There is, however, an ill-defined low density area in the periventricular white

matter of the left frontoparietal lobe, which extends caudally into the left

basal ganglia consistent with a recent infarction.  No mass effect is seen.  No

other evidence of infarction seen.  Gray/white differentiation pattern is

otherwise intact.

 

Impression:

 

Findings consistent with recent periventricular white matter infarct left

frontoparietal region extending into the left basal ganglia.  No hemorrhage is

seen.  This lesion was not visible previously on MRI study 2012.

 

 

Signed by

Mike Nguyen MD 04/14/2017 03:28 P

## 2017-04-14 NOTE — REP
MR angiography the brain without contrast:

 

History:  Left hand numbness, now resolved.

 

Comparison CT study is from earlier this date.

 

Technique:  3-D time-of-flight MR angiography of the brain is acquired in the

usual fashion and maximal intensity projection images were generated in

rotational format about the vertical and horizontal axes.  In addition, source

axial T1-weighted images are viewed in cine mode.

 

MR angiographic findings:  The distal vertebral arteries are patent, right larger

than left.  Basilar artery is widely patent.  Posterior cerebral and superior

cerebellar vessels are normal.  The right posterior cerebral takes a persistent

fetal origin from the anterior circulation.  This is a common normal variant.

 

The distal internal carotid arteries are unremarkable and symmetric.  Anterior

and middle cerebral arteries are intact.  There is focal irregularity at the

trifurcation of the left middle cerebral artery in the sylvian fissure of

uncertain significance.  This may be artifactual.  No other abnormality is seen.

No evidence of berry aneurysm or arteriovenous malformation.

 

Impression:

 

1.  Persistent fetal origin right posterior cerebral artery.

 

2.  Equivocal irregularity at the trifurcation of the left middle cerebral artery

in the sylvian fissure.  Otherwise negative MR angiography of the brain.

 

 

Signed by

Mike Nguyen MD 04/14/2017 06:01 P

## 2017-04-14 NOTE — HPEPDOC
Medical History and Physical


Date of Admission


17





History and Physical





ATTENDING: Dr. Rodriguez


PCP: JHONNY Bailon NP





CC: Left sided numbness





HPI:46yoF with a past medical history significant for multiple medical 

comorbidities who was admitted to [facility] on 2017 with acute 

onset right hemiparesis. Pt had acute right hemiparesis. She was found to have 

a left dorsal basal ganglia acute infarct. She was started on dual antiplatelet 

platelet therapy (aspirin and Plavix). Echocardiogram was negative for any 

thrombus. CT scan of the head and neck was without significant atherosclerotic 

disease.  Subsequent recommendation was for acute rehabilitation. On 2017 

the patient was deemed stable and was admitted to [facility] 

inpatient rehabilitation unit, later discharged 3/31/17. At discharge she had 

residual right sided weakness and has been working with therapy at home. She 

uses a cane. She had f/u with [facility] scheduled 17. 


This AM HH[facility] sent her to ED for eval related to numbness in the left hand and 

left sided epistaxis. Pt had reported that her sx resolved prior to her triage. 


Denies any fevers, chills, weakness, fatigue, HA, CP, SOB, cough, palpitations, 

abdominal pain, N/V/D or changes in bowel or bladder habits. 


Upon presentation to the hospital the patient was found to have H/O left sided 

numbness, thus the hospitalist team was consulted.





PMHx: 


H/O CVA 3/17


Hypertension


Hyperlipidemia


NIDDM


Tobacco use


Depression 


Anxiety


Thyroid nodule, benign


Low back pain 








PSHX:


Hysterectomy


 x 2


Tubal ligation





SOCHX:


Resides in:  [place]


Marital Status:  


Kids: 3


Employment: [employer]


Tobacco use: quit 3/17


ETOH: denies


Illicit Drugs: Denies


Recent travel: denies


Advanced directives: none





FAMHX:


Mother:  unknown


Father:  Parkinsons disease


Siblings: Alive, well


Children: Alive, depression, bipolar


Unexpected deaths due to medical reasons: None.





ROS:


As noted in HPI, otherwise 11pt ROS of systems reviewed and remarkable for 

residual right sided weakness as above. 


                 


PE:     


GEN:  46yoF, appears stated age. Well-nourished, well developed. No acute 

distress. Alert and oriented x 3. Pleasant, interactive. 


HEENT: Normocephalic, atraumatic. Pupils are equal, round, and reactive to 

light. Extraocular movements are intact. No nystagmus appreciated. Sclera are 

nonicteric. Conjunctiva without injection. Nose midline. Nasal turbinates 

without bogginess. EACs both patent BL. TMs both visualized and gray with good 

cone of light, no bulging or erythema. No facial asymmetry. Moist mucous 

membranes. Dentition fair. Pharynx pink and moist, no cobblestoning. Neck supple

, trachea midline. No lymphadenopathy or thyromegaly appreciated. 


CHEST: Regular rate and rhythm, +S1, +S2


LUNGS: Clear to auscultation bilaterally. No wheezes, rales, or rhonchi. 

Breathing appears symmetric and easy. Patient is speaking in full sentences. No 

accessory muscle use.


ABD: Round, soft, non-tender, non-distended. +Bowel sounds throughout. No 

rebound or guarding. No costovertebral angle tenderness.


EXT: Pulses 2+ bilaterally dorsalis pedis and radial. No lower extremity edema 

appreciated.


SKIN: Pink, dry, warm. Capillary refill <2sec. No rashes.


NEURO: Alert and oriented x 3. Cranial nerves III-XII are intact. Right sided 

hemiparesis (chronic since CVA). Strenghth and sensation intact LUE/LLL. 








CT: report pending. Prelim c/w recent left basal ganglia infarct. No 

hemorrhage. 


Thyroid U/S 


Similar findings compared to the prior study. Bilateral nodules appear stable.


EKG: SR 77bpm








A&P:  46yoF with a past medical history significant for multiple medical 

comorbidities who was admitted to [facility] on 2017 with acute 

onset right hemiparesis. Pt had acute right hemiparesis. She was found to have 

a left dorsal basal ganglia acute infarct. She was started on dual antiplatelet 

platelet therapy (aspirin and Plavix). Echocardiogram was negative for any 

thrombus. CT scan of the head and neck was without significant atherosclerotic 

disease.  Subsequent recommendation was for acute rehabilitation. On 2017 

the patient was deemed stable and was admitted to [facility] 

inpatient rehabilitation unit, later discharged 3/31/17. At discharge she had 

residual right sided weakness and has been working with therapy at home. She 

uses a cane. She had f/u with [facility] scheduled 17. 


This AM N sent her to ED for eval related to numbness in the left hand and 

left sided epistaxis. Pt had reported that her sx resolved prior to her triage.


 The patient will be admitted to PCU for at least 2 midnights to Dr. Rodriguez's 

service. Pt is discussed with Dr Gunderson. 


Left sided Numbness. Sx currently resolved. MRI/MRA brain pending. Dr Almaraz 

will see pt in Clt as well. 


H/O CVA/Residual Right hemiparesis. ASA/Plavix. PT/OT. 


HLD. Statin. 


NIDDM. CC diet, SSI. Glipizide on hold. 


HTN. Losartan with hold paramters. CIP/Trop x 3 sets. 


Anxiety. Buspar prn. 


Depression. Wellbutrin XL. 





DVT prophylaxis. Lovenox. 


The patient is a Full code.





Vital Signs





 Vital Signs








  Date Time  Temp Pulse Resp B/P Pulse Ox O2 Delivery O2 Flow Rate FiO2


 


17 12:52 97.3 84 16 156/92 98 Room Air  











Laboratory Data


Labs 24H


 Laboratory Tests 2


17 13:56: 


Activated Partial Thromboplast Time 30.9, Anion Gap 9, White Blood Count 11.5H, 

Red Blood Count 5.08, Hemoglobin 14.5, Hematocrit 42.8, Mean Corpuscular Volume 

84.4, Mean Corpuscular Hemoglobin 28.5, Mean Corpuscular Hemoglobin Concent 33.8

, Red Cell Distribution Width 12.4, Platelet Count 300, Neutrophils (%) (Auto) 

54.6, Lymphocytes (%) (Auto) 31.1, Monocytes (%) (Auto) 5.8H, Eosinophils (%) (

Auto) 6.2H, Basophils (%) (Auto) 0.4, Neutrophils # (Auto) 6.3, Lymphocytes # (

Auto) 3.6, Monocytes # (Auto) 0.7, Eosinophils # (Auto) 0.7H, Basophils # (Auto

) 0.0, Blood Urea Nitrogen 13, Creatinine 0.64, Sodium Level 137, Potassium 

Level 3.7, Chloride Level 102, Carbon Dioxide Level 26, Calcium Level 9.2, 

Total Creatine Kinase 77, Creatine Kinase MB 1.0, Creatine Kinase MB Relative 

Index 1.29, Glomerular Filtration Rate > 60.0, Large Unclassified Cells # 0.2, 

Large Unclassified Cells % 1.9, Prothromb Time International Ratio 0.95, 

Prothrombin Time 12.8, Troponin I < 0.02


CBC/BMP


 Laboratory Tests


17 13:56








Calcium Level 9.2, Total Creatine Kinase 77, Red Blood Count 5.08, Mean 

Corpuscular Volume 84.4, Mean Corpuscular Hemoglobin 28.5, Mean Corpuscular 

Hemoglobin Concent 33.8, Red Cell Distribution Width 12.4, Neutrophils (%) (Auto

) 54.6, Lymphocytes (%) (Auto) 31.1, Monocytes (%) (Auto) 5.8 H, Eosinophils (%

) (Auto) 6.2 H, Basophils (%) (Auto) 0.4, Neutrophils # (Auto) 6.3, Lymphocytes 

# (Auto) 3.6, Monocytes # (Auto) 0.7, Eosinophils # (Auto) 0.7 H, Basophils # (

Auto) 0.0





Home Medications


Scheduled


Aspirin (Aspirin EC) 325 Mg Tabec 325 MG PO DAILY  


Atorvastatin Calcium (Atorvastatin Calcium) 80 Mg Tab 80 MG PO QHS  


Bupropion Hcl (Bupropion HCl Xl) 150 Mg Tab 150 MG PO DAILY  


Clopidogrel Bisulfate (Plavix) 75 Mg Tab 75 MG PO DAILY  


Docusate Sodium (Colace) 100 Mg Cap 100 MG PO DAILY  


Glipizide (Glipizide ER) 5 Mg Tab 5 MG PO DAILY  


Losartan Potassium (Losartan Potassium) 25 Mg Tab 25 MG PO DAILY  





Scheduled PRN


Acetaminophen (Acetaminophen) 325 Mg Tab 650 MG PO Q4H PRN PRN PAIN / FEVER 


Buspirone HCl (Buspirone HCl) 10 Mg Tab 10 MG PO TID PRN PRN ANXIETY 





Allergies


Coded Allergies:  


     Metformin (Unverified  Adverse Reaction, Unknown, "STATES CAN NOT TAKE" 

DOES NOT WORK WELL, 3/17/17)








Marie Raza 2017 15:41

## 2017-04-16 NOTE — DSES
DATE OF ADMISSION:  04/15/2017

DATE OF DISCHARGE:  04/15/2017

 

Specialists involved in her care include Dr. Noyola.

 

No complications of her stay.

 

No procedures performed during her stay.

 

DISCHARGE DIAGNOSES:

1.  Possible transient ischemic attack (TIA).

2.  Hypertension.

3.  Recent CVA.

4.  Hyperlipidemia.

5.  Non-insulin dependent diabetes.

6.  Tobacco abuse.

7.  Depression and anxiety.

8.  Thyroid nodule.

9.  Low back pain.

 

The following is a summary of her hospitalization:

This is a 46-year-old who presented with some left-sided hand and lower arm

numbness which was transient and resolved spontaneously.  She was monitored on

telemetry overnight and has been seen by neurology who has recommended further

workup with coagulable panel, results of which will be available as an

outpatient.  Today, she is feeling well, is back to baseline, and has been

walking at her baseline level ability and tolerating diet.

 

Temperature 97.3, pulse 84, respirations 18, blood pressure 115/80, 98% on room

air.  Awake, appropriately interactive, pleasantly conversant.  Breathing is

symmetrical and rested.  Heart is in a regular rate and rhythm.  She has

decreased strength on the right side.  She is able to flex her right hand slowly,

unable to extend it.  Abdomen soft, doughy, nontender.

 

White cell count 12.5, hemoglobin 14, and platelets of 280, glucose of 116,

troponins negative times three.

 

Discharge instructions include the following:

Followup with Dr. Noyola, call his office for followup instructions on Monday

morning.  Followup with Twan Bailon within 1 week.  Activity and diet as

tolerated.

Continue with:

- Tylenol every 4 hours as needed for pain

- aspirin 325 mg by mouth daily

- atorvastatin 80 mg by mouth daily at bedtime

- Wellbutrin  mg by mouth daily

- BuSpar 10 mg by mouth three times a day

- Plavix 75 mg by mouth daily

- Colace 100 mg by mouth daily

- glipizide ER 5 mg by mouth daily

- losartan 25 mg by mouth daily

## 2017-04-16 NOTE — ECGEPIP
Stationary ECG Study

                           [facility] - ED

                                       

                                       Test Date:    2017

Pat Name:     VELMA BECK            Department:   

Patient ID:   [id number]                 Room:         -

Gender:       F                        Technician:   sandhya

:          1971               Requested By: MARTITA ASENCIO

Order Number: [id number]     Reading MD:   Maja Lundborg-Gray

                                 Measurements

Intervals                              Axis          

Rate:         77                       P:            33

KY:           182                      QRS:          7

QRSD:         89                       T:            10

QT:           380                                    

QTc:          431                                    

                           Interpretive Statements

SINUS RHYTHM

DELAYED R WAVE PROGRESSION

NONSPECIFIC ANTEROSEPTAL ST T WAVE CHANGES VS ISCHEMIA

 

CW 2/15/12 - RATE DECREASED

NONSPEIFIC ANTEROSEPTAL ST T WAVE CHANGES

Electronically Signed On 2017 9:02:39 EDT by Maja Lundborg-Gray

## 2018-05-08 NOTE — REP
Chest x-ray:  Two views.

 

History:  Leukocytosis.

 

Comparison study February 15, 2012.

 

Findings:  The lungs are symmetrically aerated and free of infiltrate.  Pleural

angles are sharp.  Heart size is normal.  Pulmonary vasculature is not

increased.

 

Impression:

 

No acute disease.

 

 

Signed by

Mike Nguyen MD 03/19/2017 05:11 P Body Location Override (Optional - Billing Will Still Be Based On Selected Body Map Location If Applicable): Central Mid Back Detail Level: Detailed Add 57280 Cpt? (Important Note: In 2017 The Use Of 71370 Is Being Tracked By Cms To Determine Future Global Period Reimbursement For Global Periods): no